# Patient Record
Sex: FEMALE | Race: ASIAN | Employment: FULL TIME | URBAN - METROPOLITAN AREA
[De-identification: names, ages, dates, MRNs, and addresses within clinical notes are randomized per-mention and may not be internally consistent; named-entity substitution may affect disease eponyms.]

---

## 2019-01-18 PROCEDURE — 88175 CYTOPATH C/V AUTO FLUID REDO: CPT | Performed by: OBSTETRICS & GYNECOLOGY

## 2019-01-18 PROCEDURE — 87624 HPV HI-RISK TYP POOLED RSLT: CPT | Performed by: OBSTETRICS & GYNECOLOGY

## 2019-03-18 ENCOUNTER — LAB ENCOUNTER (OUTPATIENT)
Dept: LAB | Facility: REFERENCE LAB | Age: 34
End: 2019-03-18
Attending: OBSTETRICS & GYNECOLOGY
Payer: COMMERCIAL

## 2019-03-18 ENCOUNTER — OFFICE VISIT (OUTPATIENT)
Dept: OBGYN CLINIC | Facility: CLINIC | Age: 34
End: 2019-03-18
Payer: COMMERCIAL

## 2019-03-18 VITALS
SYSTOLIC BLOOD PRESSURE: 110 MMHG | WEIGHT: 144 LBS | BODY MASS INDEX: 26.5 KG/M2 | DIASTOLIC BLOOD PRESSURE: 66 MMHG | HEIGHT: 62 IN

## 2019-03-18 DIAGNOSIS — Z11.3 SCREENING EXAMINATION FOR VENEREAL DISEASE: ICD-10-CM

## 2019-03-18 DIAGNOSIS — Z32.01 PREGNANCY EXAMINATION OR TEST, POSITIVE RESULT: ICD-10-CM

## 2019-03-18 DIAGNOSIS — Z32.01 PREGNANCY EXAMINATION OR TEST, POSITIVE RESULT: Primary | ICD-10-CM

## 2019-03-18 LAB
B-HCG SERPL-ACNC: ABNORMAL MIU/ML
CONTROL LINE PRESENT WITH A CLEAR BACKGROUND (YES/NO): YES YES/NO
PREGNANCY TEST, URINE: POSITIVE
PROGEST SERPL-MCNC: 22.2 NG/ML

## 2019-03-18 PROCEDURE — 36415 COLL VENOUS BLD VENIPUNCTURE: CPT

## 2019-03-18 PROCEDURE — 84702 CHORIONIC GONADOTROPIN TEST: CPT

## 2019-03-18 PROCEDURE — 87491 CHLMYD TRACH DNA AMP PROBE: CPT | Performed by: OBSTETRICS & GYNECOLOGY

## 2019-03-18 PROCEDURE — 81025 URINE PREGNANCY TEST: CPT | Performed by: OBSTETRICS & GYNECOLOGY

## 2019-03-18 PROCEDURE — 99203 OFFICE O/P NEW LOW 30 MIN: CPT | Performed by: OBSTETRICS & GYNECOLOGY

## 2019-03-18 PROCEDURE — 87591 N.GONORRHOEAE DNA AMP PROB: CPT | Performed by: OBSTETRICS & GYNECOLOGY

## 2019-03-18 PROCEDURE — 84144 ASSAY OF PROGESTERONE: CPT

## 2019-03-18 NOTE — PROGRESS NOTES
NEW GYN H&P     3/18/2019  9:42 AM    CC: Patient is here to establish care and for confirmation of pregnancy    HPI: Patient is a 29year old  LMP 2019 EGA 7.3w DEANGELO 2019 here to confirm positive HPT. + Office UCG today.  Reports fatigue an Sexual activity: Yes        Partners: Male        Birth control/protection: Condom    Social History    Social History Narrative      Not on file      ROS:     Review of Systems:  General: no complaints  Eyes: no complaints  Respiratory: no complaints  Car

## 2019-03-19 LAB
C TRACH DNA SPEC QL NAA+PROBE: NEGATIVE
N GONORRHOEA DNA SPEC QL NAA+PROBE: NEGATIVE

## 2019-03-19 NOTE — PROGRESS NOTES
Released to [employer] and results viewed by ptDaniel Mcghee on voicemail for pt to schedule ultrasound.

## 2019-03-21 ENCOUNTER — HOSPITAL ENCOUNTER (OUTPATIENT)
Dept: ULTRASOUND IMAGING | Facility: HOSPITAL | Age: 34
Discharge: HOME | End: 2019-03-21
Attending: OBSTETRICS & GYNECOLOGY
Payer: COMMERCIAL

## 2019-03-21 DIAGNOSIS — Z32.01 PREGNANCY EXAMINATION OR TEST, POSITIVE RESULT: ICD-10-CM

## 2019-03-21 PROCEDURE — 76801 OB US < 14 WKS SINGLE FETUS: CPT | Performed by: OBSTETRICS & GYNECOLOGY

## 2019-03-21 PROCEDURE — 76817 TRANSVAGINAL US OBSTETRIC: CPT | Performed by: OBSTETRICS & GYNECOLOGY

## 2019-04-10 ENCOUNTER — ROUTINE PRENATAL (OUTPATIENT)
Dept: OBGYN CLINIC | Facility: CLINIC | Age: 34
End: 2019-04-10
Payer: COMMERCIAL

## 2019-04-10 ENCOUNTER — LAB ENCOUNTER (OUTPATIENT)
Dept: LAB | Facility: REFERENCE LAB | Age: 34
End: 2019-04-10
Attending: OBSTETRICS & GYNECOLOGY
Payer: COMMERCIAL

## 2019-04-10 VITALS
WEIGHT: 145.88 LBS | SYSTOLIC BLOOD PRESSURE: 108 MMHG | BODY MASS INDEX: 26.84 KG/M2 | HEIGHT: 62 IN | DIASTOLIC BLOOD PRESSURE: 70 MMHG

## 2019-04-10 DIAGNOSIS — Z36.9 UNSPECIFIED ANTENATAL SCREENING: Primary | ICD-10-CM

## 2019-04-10 DIAGNOSIS — Z36.9 UNSPECIFIED ANTENATAL SCREENING: ICD-10-CM

## 2019-04-10 DIAGNOSIS — Z34.01 PRIMIGRAVIDA, FIRST TRIMESTER: ICD-10-CM

## 2019-04-10 DIAGNOSIS — O34.10 UTERINE FIBROID IN PREGNANCY: ICD-10-CM

## 2019-04-10 DIAGNOSIS — D25.9 UTERINE FIBROID IN PREGNANCY: ICD-10-CM

## 2019-04-10 PROCEDURE — 86780 TREPONEMA PALLIDUM: CPT

## 2019-04-10 PROCEDURE — 86901 BLOOD TYPING SEROLOGIC RH(D): CPT | Performed by: OBSTETRICS & GYNECOLOGY

## 2019-04-10 PROCEDURE — 85025 COMPLETE CBC W/AUTO DIFF WBC: CPT

## 2019-04-10 PROCEDURE — 86762 RUBELLA ANTIBODY: CPT

## 2019-04-10 PROCEDURE — 86850 RBC ANTIBODY SCREEN: CPT

## 2019-04-10 PROCEDURE — 87389 HIV-1 AG W/HIV-1&-2 AB AG IA: CPT

## 2019-04-10 PROCEDURE — 36415 COLL VENOUS BLD VENIPUNCTURE: CPT

## 2019-04-10 PROCEDURE — 81220 CFTR GENE COM VARIANTS: CPT

## 2019-04-10 PROCEDURE — 86900 BLOOD TYPING SEROLOGIC ABO: CPT | Performed by: OBSTETRICS & GYNECOLOGY

## 2019-04-10 PROCEDURE — 87340 HEPATITIS B SURFACE AG IA: CPT

## 2019-04-11 NOTE — PROGRESS NOTES
Here for NOB and PN labs + FFDNA screening. Counseling and prenatal assessment done.  Discussed genetic screening options, nutrition, prenatal vitamins, activity, lifestyle habits, exercise, travel, and avoidence of extreme heat and toxins such as nicotine,

## 2019-04-17 ENCOUNTER — TELEPHONE (OUTPATIENT)
Dept: OBGYN CLINIC | Facility: CLINIC | Age: 34
End: 2019-04-17

## 2019-04-17 NOTE — TELEPHONE ENCOUNTER
Per Dr Zahra Stone call patient with negative Prequel genetic screen. Spoke with patient negative results given along with sex of baby( Female). Pt very excited and happy about results.

## 2019-04-24 ENCOUNTER — ROUTINE PRENATAL (OUTPATIENT)
Dept: OBGYN CLINIC | Facility: CLINIC | Age: 34
End: 2019-04-24
Payer: COMMERCIAL

## 2019-04-24 VITALS
WEIGHT: 149.81 LBS | HEIGHT: 62 IN | DIASTOLIC BLOOD PRESSURE: 70 MMHG | SYSTOLIC BLOOD PRESSURE: 112 MMHG | BODY MASS INDEX: 27.57 KG/M2

## 2019-04-24 DIAGNOSIS — Z36.9 UNSPECIFIED ANTENATAL SCREENING: ICD-10-CM

## 2019-04-24 DIAGNOSIS — Z34.01 PRIMIGRAVIDA, FIRST TRIMESTER: Primary | ICD-10-CM

## 2019-04-24 PROBLEM — O09.899 RUBELLA NON-IMMUNE STATUS, ANTEPARTUM: Status: ACTIVE | Noted: 2019-04-24

## 2019-04-24 PROBLEM — O99.891 RUBELLA NON-IMMUNE STATUS, ANTEPARTUM: Status: ACTIVE | Noted: 2019-04-24

## 2019-04-24 PROBLEM — Z28.3 RUBELLA NON-IMMUNE STATUS, ANTEPARTUM: Status: ACTIVE | Noted: 2019-04-24

## 2019-04-24 PROBLEM — Z28.39 RUBELLA NON-IMMUNE STATUS, ANTEPARTUM: Status: ACTIVE | Noted: 2019-04-24

## 2019-04-24 PROBLEM — Z28.39 RUBELLA NON-IMMUNE STATUS, ANTEPARTUM (HCC): Status: ACTIVE | Noted: 2019-04-24

## 2019-04-24 PROBLEM — O09.899 RUBELLA NON-IMMUNE STATUS, ANTEPARTUM (HCC): Status: ACTIVE | Noted: 2019-04-24

## 2019-04-24 NOTE — PROGRESS NOTES
Appetite improving. Reviewed rubella non-immune status and need for vaccine postpartum. Has a cat - will check toxo titers.

## 2019-05-22 ENCOUNTER — ROUTINE PRENATAL (OUTPATIENT)
Dept: OBGYN CLINIC | Facility: CLINIC | Age: 34
End: 2019-05-22
Payer: COMMERCIAL

## 2019-05-22 VITALS
SYSTOLIC BLOOD PRESSURE: 116 MMHG | BODY MASS INDEX: 27.44 KG/M2 | WEIGHT: 149.13 LBS | DIASTOLIC BLOOD PRESSURE: 70 MMHG | HEIGHT: 62 IN

## 2019-05-22 DIAGNOSIS — Z34.02 PRIMIGRAVIDA, SECOND TRIMESTER: Primary | ICD-10-CM

## 2019-05-22 DIAGNOSIS — Z36.9 UNSPECIFIED ANTENATAL SCREENING: ICD-10-CM

## 2019-05-22 NOTE — PROGRESS NOTES
Feeling better with improved appetite. Still needs to get toxo titers done. For 20w anatomic survey next visit.

## 2019-06-27 ENCOUNTER — ROUTINE PRENATAL (OUTPATIENT)
Dept: OBGYN CLINIC | Facility: CLINIC | Age: 34
End: 2019-06-27
Payer: COMMERCIAL

## 2019-06-27 ENCOUNTER — ULTRASOUND ENCOUNTER (OUTPATIENT)
Dept: OBGYN CLINIC | Facility: CLINIC | Age: 34
End: 2019-06-27
Payer: COMMERCIAL

## 2019-06-27 VITALS
HEIGHT: 62 IN | DIASTOLIC BLOOD PRESSURE: 64 MMHG | WEIGHT: 152.69 LBS | SYSTOLIC BLOOD PRESSURE: 110 MMHG | BODY MASS INDEX: 28.1 KG/M2

## 2019-06-27 DIAGNOSIS — O34.10 UTERINE FIBROID IN PREGNANCY: ICD-10-CM

## 2019-06-27 DIAGNOSIS — Z34.02 PRIMIGRAVIDA, SECOND TRIMESTER: Primary | ICD-10-CM

## 2019-06-27 DIAGNOSIS — Z36.9 UNSPECIFIED ANTENATAL SCREENING: ICD-10-CM

## 2019-06-27 DIAGNOSIS — D25.9 UTERINE FIBROID IN PREGNANCY: ICD-10-CM

## 2019-06-27 PROCEDURE — 76817 TRANSVAGINAL US OBSTETRIC: CPT | Performed by: OBSTETRICS & GYNECOLOGY

## 2019-06-27 PROCEDURE — 76805 OB US >/= 14 WKS SNGL FETUS: CPT | Performed by: OBSTETRICS & GYNECOLOGY

## 2019-06-27 NOTE — PROGRESS NOTES
Active FM without complaints.  Had anatomic survey USN today = normal with small 1.4 x 1.1 cm lower anterior fibroid

## 2019-07-24 ENCOUNTER — ROUTINE PRENATAL (OUTPATIENT)
Dept: OBGYN CLINIC | Facility: CLINIC | Age: 34
End: 2019-07-24
Payer: COMMERCIAL

## 2019-07-24 VITALS
WEIGHT: 159.31 LBS | DIASTOLIC BLOOD PRESSURE: 60 MMHG | HEIGHT: 62 IN | BODY MASS INDEX: 29.32 KG/M2 | SYSTOLIC BLOOD PRESSURE: 106 MMHG

## 2019-07-24 DIAGNOSIS — Z36.9 UNSPECIFIED ANTENATAL SCREENING: Primary | ICD-10-CM

## 2019-07-24 DIAGNOSIS — Z34.02 PRIMIGRAVIDA, SECOND TRIMESTER: ICD-10-CM

## 2019-07-24 LAB — MULTISTIX LOT#: NORMAL NUMERIC

## 2019-07-24 PROCEDURE — 81002 URINALYSIS NONAUTO W/O SCOPE: CPT | Performed by: OBSTETRICS & GYNECOLOGY

## 2019-08-16 ENCOUNTER — HOSPITAL ENCOUNTER (OUTPATIENT)
Facility: HOSPITAL | Age: 34
Setting detail: OBSERVATION
Discharge: HOME | End: 2019-08-16
Attending: OBSTETRICS & GYNECOLOGY | Admitting: OBSTETRICS & GYNECOLOGY
Payer: COMMERCIAL

## 2019-08-16 VITALS — HEART RATE: 88 BPM | SYSTOLIC BLOOD PRESSURE: 130 MMHG | TEMPERATURE: 98 F | DIASTOLIC BLOOD PRESSURE: 72 MMHG

## 2019-08-16 PROBLEM — Z34.90 PREGNANCY (HCC): Status: ACTIVE | Noted: 2019-08-16

## 2019-08-16 PROBLEM — Z34.90 PREGNANCY: Status: ACTIVE | Noted: 2019-08-16

## 2019-08-16 LAB
BILIRUB UR QL: NEGATIVE
CLARITY UR: CLEAR
COLOR UR: YELLOW
GLUCOSE UR-MCNC: NEGATIVE MG/DL
KETONES UR-MCNC: NEGATIVE MG/DL
NITRITE UR QL STRIP.AUTO: NEGATIVE
PH UR: 7 [PH] (ref 5–8)
PROT UR-MCNC: NEGATIVE MG/DL
RBC #/AREA URNS AUTO: 3 /HPF
SP GR UR STRIP: 1.01 (ref 1–1.03)
UROBILINOGEN UR STRIP-ACNC: <2
VIT C UR-MCNC: NEGATIVE MG/DL
WBC #/AREA URNS AUTO: 12 /HPF

## 2019-08-16 PROCEDURE — 87086 URINE CULTURE/COLONY COUNT: CPT | Performed by: OBSTETRICS & GYNECOLOGY

## 2019-08-16 PROCEDURE — 99213 OFFICE O/P EST LOW 20 MIN: CPT

## 2019-08-16 PROCEDURE — 81001 URINALYSIS AUTO W/SCOPE: CPT | Performed by: OBSTETRICS & GYNECOLOGY

## 2019-08-16 RX ORDER — NITROFURANTOIN 25; 75 MG/1; MG/1
100 CAPSULE ORAL 2 TIMES DAILY
Qty: 6 CAPSULE | Refills: 0 | Status: SHIPPED | OUTPATIENT
Start: 2019-08-16 | End: 2019-08-19

## 2019-08-17 NOTE — TRIAGE
[facility]OSP - [facility]      Triage Note    Eleni Batch Patient Status:  Observation    1985 MRN [id number]   Location [facility] Attending Hortensia Jaime MD   Hosp Day # 0 PCP MD Tiffanie Farmer Appropriate for gestational age          FHR Category: Category I           Additional Comments       Reason for visit: Pt presented to triage with c/o constant bilateral lower abdominal pain that radiates to her back and urinary frequency/urgency.  Pt db

## 2019-08-17 NOTE — PROGRESS NOTES
Pt presented to triage with c/o bilateral lower abdominal pain that radiates to her back since 12p accompanied by urinary frequency/urgency. Pt states \"I didn't pay to much attention to fetal movements because of pain\".  Pt denies vag bleeding/LOF at this

## 2019-08-18 ENCOUNTER — PATIENT MESSAGE (OUTPATIENT)
Dept: OBGYN CLINIC | Facility: CLINIC | Age: 34
End: 2019-08-18

## 2019-08-19 NOTE — PROGRESS NOTES
Spoke to pt and informed her to continue her abx till complete. Pt will likely have a U/A done at her next appt on 8/28/19. Voiced understanding.

## 2019-08-19 NOTE — TELEPHONE ENCOUNTER
From: Lilli Felipe  To:  Erik Pat MD  Sent: 8/18/2019 12:30 PM CDT  Subject: Test Results Question    I have a question about URINE CULTURE, ROUTINE resulted on 8/17/19, 5:54 PM.    Hi Doctor,    Should I still schedule a nurse visit on Wednesday fo

## 2019-08-28 ENCOUNTER — ROUTINE PRENATAL (OUTPATIENT)
Dept: OBGYN CLINIC | Facility: CLINIC | Age: 34
End: 2019-08-28
Payer: COMMERCIAL

## 2019-08-28 ENCOUNTER — LAB ENCOUNTER (OUTPATIENT)
Dept: LAB | Facility: REFERENCE LAB | Age: 34
End: 2019-08-28
Attending: OBSTETRICS & GYNECOLOGY
Payer: COMMERCIAL

## 2019-08-28 VITALS
SYSTOLIC BLOOD PRESSURE: 108 MMHG | HEIGHT: 62 IN | BODY MASS INDEX: 30.03 KG/M2 | DIASTOLIC BLOOD PRESSURE: 66 MMHG | WEIGHT: 163.19 LBS

## 2019-08-28 DIAGNOSIS — Z36.9 UNSPECIFIED ANTENATAL SCREENING: Primary | ICD-10-CM

## 2019-08-28 DIAGNOSIS — Z36.9 UNSPECIFIED ANTENATAL SCREENING: ICD-10-CM

## 2019-08-28 DIAGNOSIS — Z34.03 PRIMIGRAVIDA, THIRD TRIMESTER: ICD-10-CM

## 2019-08-28 LAB
DEPRECATED RDW RBC AUTO: 40.6 FL (ref 35.1–46.3)
ERYTHROCYTE [DISTWIDTH] IN BLOOD BY AUTOMATED COUNT: 13.3 % (ref 11–15)
GLUCOSE 1H P GLC SERPL-MCNC: 172 MG/DL
HCT VFR BLD AUTO: 35 % (ref 35–48)
HGB BLD-MCNC: 11.8 G/DL (ref 12–16)
MCH RBC QN AUTO: 29 PG (ref 26–34)
MCHC RBC AUTO-ENTMCNC: 33.7 G/DL (ref 31–37)
MCV RBC AUTO: 86 FL (ref 80–100)
MULTISTIX LOT#: NORMAL NUMERIC
PLATELET # BLD AUTO: 259 10(3)UL (ref 150–450)
RBC # BLD AUTO: 4.07 X10(6)UL (ref 3.8–5.3)
WBC # BLD AUTO: 15.6 X10(3) UL (ref 4–11)

## 2019-08-28 PROCEDURE — 81002 URINALYSIS NONAUTO W/O SCOPE: CPT | Performed by: OBSTETRICS & GYNECOLOGY

## 2019-08-28 PROCEDURE — 36415 COLL VENOUS BLD VENIPUNCTURE: CPT

## 2019-08-28 PROCEDURE — 90715 TDAP VACCINE 7 YRS/> IM: CPT | Performed by: OBSTETRICS & GYNECOLOGY

## 2019-08-28 PROCEDURE — 85027 COMPLETE CBC AUTOMATED: CPT

## 2019-08-28 PROCEDURE — 90471 IMMUNIZATION ADMIN: CPT | Performed by: OBSTETRICS & GYNECOLOGY

## 2019-08-28 PROCEDURE — 82950 GLUCOSE TEST: CPT

## 2019-08-28 NOTE — PROGRESS NOTES
For routine PN visit and GTT + tdap today. No complaints with active FM and BHUCs - would like exam. SVE LTC.

## 2019-08-29 ENCOUNTER — TELEPHONE (OUTPATIENT)
Dept: OBGYN CLINIC | Facility: CLINIC | Age: 34
End: 2019-08-29

## 2019-08-29 DIAGNOSIS — O99.810 ABNORMAL MATERNAL GLUCOSE TOLERANCE, ANTEPARTUM: Primary | ICD-10-CM

## 2019-08-29 NOTE — PROGRESS NOTES
Carrie Woodson RN         8/29/19 11:02 AM   Note       Instructions given for 3 hour GTT testing. Order placed. Pt verbalized understanding and agrees with plan of care.

## 2019-08-29 NOTE — TELEPHONE ENCOUNTER
Instructions given for 3 hour GTT testing. Order placed. Pt verbalized understanding and agrees with plan of care.

## 2019-08-30 NOTE — TELEPHONE ENCOUNTER
Patient called to say she was going to [facility] for 3 hour GTT. Told patient that they should have the order and would be okay to go to [facility].

## 2019-09-03 ENCOUNTER — LAB ENCOUNTER (OUTPATIENT)
Dept: LAB | Age: 34
End: 2019-09-03
Attending: OBSTETRICS & GYNECOLOGY
Payer: COMMERCIAL

## 2019-09-03 DIAGNOSIS — O99.810 ABNORMAL MATERNAL GLUCOSE TOLERANCE, ANTEPARTUM: ICD-10-CM

## 2019-09-03 LAB
1 HR GLUCOSE GESTATIONAL: 187 MG/DL
GLUCOSE 1H P GLC SERPL-MCNC: 175 MG/DL
GLUCOSE 3H P GLC SERPL-MCNC: 120 MG/DL
GLUCOSE BLD-MCNC: 87 MG/DL (ref 70–99)
GLUCOSE P FAST SERPL-MCNC: 89 MG/DL

## 2019-09-03 PROCEDURE — 36415 COLL VENOUS BLD VENIPUNCTURE: CPT

## 2019-09-03 PROCEDURE — 82952 GTT-ADDED SAMPLES: CPT

## 2019-09-03 PROCEDURE — 82962 GLUCOSE BLOOD TEST: CPT

## 2019-09-03 PROCEDURE — 82951 GLUCOSE TOLERANCE TEST (GTT): CPT

## 2019-09-05 ENCOUNTER — TELEPHONE (OUTPATIENT)
Dept: OBGYN CLINIC | Facility: CLINIC | Age: 34
End: 2019-09-05

## 2019-09-05 DIAGNOSIS — O99.810 ABNORMAL MATERNAL GLUCOSE TOLERANCE, ANTEPARTUM: Primary | ICD-10-CM

## 2019-09-05 NOTE — TELEPHONE ENCOUNTER
Order generated for diabetic education. Pt called and given number to call to make this appointment. Pt has Cade Molina [id number] for 9/17. Pt verbalized understanding and agrees with plan of care.

## 2019-09-06 ENCOUNTER — PATIENT MESSAGE (OUTPATIENT)
Dept: OBGYN CLINIC | Facility: CLINIC | Age: 34
End: 2019-09-06

## 2019-09-07 ENCOUNTER — DIABETIC EDUCATION (OUTPATIENT)
Dept: ENDOCRINOLOGY CLINIC | Facility: CLINIC | Age: 34
End: 2019-09-07
Payer: COMMERCIAL

## 2019-09-07 DIAGNOSIS — O24.410 DIET CONTROLLED GESTATIONAL DIABETES MELLITUS (GDM) IN THIRD TRIMESTER: Primary | ICD-10-CM

## 2019-09-07 PROCEDURE — 97804 MEDICAL NUTRITION GROUP: CPT | Performed by: DIETITIAN, REGISTERED

## 2019-09-07 RX ORDER — BLOOD SUGAR DIAGNOSTIC
1 STRIP MISCELLANEOUS 4 TIMES DAILY
Qty: 200 STRIP | Refills: 1 | Status: ON HOLD | OUTPATIENT
Start: 2019-09-07 | End: 2019-10-26

## 2019-09-07 RX ORDER — LANCETS 33 GAUGE
1 EACH MISCELLANEOUS 4 TIMES DAILY
Qty: 1 BOX | Refills: 2 | Status: ON HOLD | OUTPATIENT
Start: 2019-09-07 | End: 2019-10-28

## 2019-09-07 NOTE — PROGRESS NOTES
Luís Boo   1985 was seen for Gestational Diabetes Counseling: Group    Date: 2019  Start time: 8:00 End time: 9:30    : 1  Para: 0  EDC: 11/3/19    GDM Screen: 3 hour Referring Provider: Dr Janel Lewis History of GDM:  NO    Family hist recommended meal plan. 2. Begin testing fasting glucose and 2 hour after meals   3. Bring glucose / food log to next MD office visit. 4. Encouraged activity if no restrictions.    5. Encouraged Cherie to call diabetes center with any questions or concerns

## 2019-09-14 ENCOUNTER — DIABETIC EDUCATION (OUTPATIENT)
Dept: ENDOCRINOLOGY CLINIC | Facility: CLINIC | Age: 34
End: 2019-09-14
Payer: COMMERCIAL

## 2019-09-14 DIAGNOSIS — O24.410 DIET CONTROLLED GESTATIONAL DIABETES MELLITUS (GDM) IN THIRD TRIMESTER: Primary | ICD-10-CM

## 2019-09-14 PROCEDURE — 97803 MED NUTRITION INDIV SUBSEQ: CPT | Performed by: DIETITIAN, REGISTERED

## 2019-09-14 NOTE — PROGRESS NOTES
Girma Fernandes  7/5/1985 was seen for Gestational Diabetes Education Follow-Up    9/14/2019  Start time: 9:40 End time: 9:55  Referring Provider: Jesus Alberto Vences    Diet: to reduce starch (bread) and increase vegetables. Sub sand with chips: BG's were elevated.

## 2019-09-17 ENCOUNTER — ROUTINE PRENATAL (OUTPATIENT)
Dept: OBGYN CLINIC | Facility: CLINIC | Age: 34
End: 2019-09-17
Payer: COMMERCIAL

## 2019-09-17 VITALS
SYSTOLIC BLOOD PRESSURE: 120 MMHG | BODY MASS INDEX: 30 KG/M2 | DIASTOLIC BLOOD PRESSURE: 78 MMHG | WEIGHT: 163 LBS | HEIGHT: 62 IN

## 2019-09-17 DIAGNOSIS — Z34.03 PRIMIGRAVIDA, THIRD TRIMESTER: Primary | ICD-10-CM

## 2019-09-17 PROBLEM — O24.410 DIET CONTROLLED GESTATIONAL DIABETES MELLITUS (GDM) IN THIRD TRIMESTER: Status: ACTIVE | Noted: 2019-09-17

## 2019-09-17 PROBLEM — O24.410 DIET CONTROLLED GESTATIONAL DIABETES MELLITUS (GDM) IN THIRD TRIMESTER (HCC): Status: ACTIVE | Noted: 2019-09-17

## 2019-09-17 LAB
GLUCOSE (URINE DIPSTICK): NEGATIVE MG/DL
MULTISTIX EXPIRATION DATE: NORMAL DATE
MULTISTIX LOT#: NORMAL NUMERIC
PROTEIN (URINE DIPSTICK): NEGATIVE MG/DL

## 2019-09-17 PROCEDURE — 81002 URINALYSIS NONAUTO W/O SCOPE: CPT | Performed by: OBSTETRICS & GYNECOLOGY

## 2019-09-17 NOTE — PROGRESS NOTES
Gestational diabetic. Fasting BS are in 90' - 100s and 2 hour PP are 95- 155 with most more than 120. No current meds. Feels like she is compliant with her diet.   Feeling very tired and lightheaded, dizzy every morning and having difficulty traveling or dr

## 2019-09-24 ENCOUNTER — ROUTINE PRENATAL (OUTPATIENT)
Dept: OBGYN CLINIC | Facility: CLINIC | Age: 34
End: 2019-09-24
Payer: COMMERCIAL

## 2019-09-24 VITALS
SYSTOLIC BLOOD PRESSURE: 110 MMHG | HEIGHT: 62 IN | WEIGHT: 163 LBS | DIASTOLIC BLOOD PRESSURE: 78 MMHG | BODY MASS INDEX: 30 KG/M2

## 2019-09-24 DIAGNOSIS — Z34.03 PRIMIGRAVIDA, THIRD TRIMESTER: Primary | ICD-10-CM

## 2019-09-24 DIAGNOSIS — O24.415 GESTATIONAL DIABETES MELLITUS (GDM) IN THIRD TRIMESTER CONTROLLED ON ORAL HYPOGLYCEMIC DRUG: ICD-10-CM

## 2019-09-24 PROCEDURE — 81002 URINALYSIS NONAUTO W/O SCOPE: CPT | Performed by: OBSTETRICS & GYNECOLOGY

## 2019-09-24 NOTE — PROGRESS NOTES
She started using only one metformin and her BS are all normal except for some 130's. I dw her that she does not need to use 2nd metfomin unless she sees her BS increasing again. NST's reactive her today. Check USN for growth.  She feels like her dizziness

## 2019-09-26 RX ORDER — BREAST PUMP
EACH MISCELLANEOUS
Qty: 1 EACH | Refills: 0 | OUTPATIENT
Start: 2019-09-26 | End: 2019-12-24

## 2019-10-02 ENCOUNTER — ROUTINE PRENATAL (OUTPATIENT)
Dept: OBGYN CLINIC | Facility: CLINIC | Age: 34
End: 2019-10-02
Payer: COMMERCIAL

## 2019-10-02 ENCOUNTER — HOSPITAL ENCOUNTER (OUTPATIENT)
Facility: HOSPITAL | Age: 34
Setting detail: OBSERVATION
Discharge: HOME | End: 2019-10-02
Attending: OBSTETRICS & GYNECOLOGY | Admitting: OBSTETRICS & GYNECOLOGY
Payer: COMMERCIAL

## 2019-10-02 VITALS — DIASTOLIC BLOOD PRESSURE: 82 MMHG | HEART RATE: 84 BPM | SYSTOLIC BLOOD PRESSURE: 115 MMHG

## 2019-10-02 VITALS
SYSTOLIC BLOOD PRESSURE: 122 MMHG | WEIGHT: 163 LBS | HEIGHT: 62 IN | BODY MASS INDEX: 30 KG/M2 | DIASTOLIC BLOOD PRESSURE: 80 MMHG

## 2019-10-02 DIAGNOSIS — Z36.9 UNSPECIFIED ANTENATAL SCREENING: Primary | ICD-10-CM

## 2019-10-02 PROCEDURE — 87653 STREP B DNA AMP PROBE: CPT | Performed by: OBSTETRICS & GYNECOLOGY

## 2019-10-02 PROCEDURE — 81002 URINALYSIS NONAUTO W/O SCOPE: CPT | Performed by: OBSTETRICS & GYNECOLOGY

## 2019-10-02 PROCEDURE — 99212 OFFICE O/P EST SF 10 MIN: CPT

## 2019-10-02 PROCEDURE — 59025 FETAL NON-STRESS TEST: CPT

## 2019-10-02 PROCEDURE — 87081 CULTURE SCREEN ONLY: CPT | Performed by: OBSTETRICS & GYNECOLOGY

## 2019-10-02 NOTE — PROGRESS NOTES
Fer Florez is here for a checkup. Patient says that her 2 hours post dinner blood sugars have been consistently above 120 in the range of 150s. I recommended that she takes 500 mg of metformin in the evening.   Her nonstress test today was nonreactive patient w

## 2019-10-03 NOTE — PROGRESS NOTES
PT PRESENTED TO TRIAGE WITH C/O DECREASED FETAL MOVEMENTS. PT DENIES VAG BLEEDING/LOF AT THIS TIME. PT ORIENTED TO ROOM, CALL LIGHT AND POC-PT VERBALIZES UNDERSTANDING.

## 2019-10-03 NOTE — TRIAGE
[facility]      Triage Note    Enrike Roy Patient Status:  Observation    1985 MRN [id number]   Location [facility] Attending [facility] Day # 0 PCP MD Marilyn Pandey Category: Category I           Additional Comments       Reason for visit: Pt presented to TR 3 with c/o decreased fetal movements. Pt denies vag bleeding/LOF at this time. Vitals WNL. NST reactive. Dr. Fei Chery notified of results; Kathryn West.  Pt d/c home in s

## 2019-10-10 ENCOUNTER — ROUTINE PRENATAL (OUTPATIENT)
Dept: OBGYN CLINIC | Facility: CLINIC | Age: 34
End: 2019-10-10
Payer: COMMERCIAL

## 2019-10-10 ENCOUNTER — LAB ENCOUNTER (OUTPATIENT)
Dept: LAB | Facility: REFERENCE LAB | Age: 34
End: 2019-10-10
Attending: OBSTETRICS & GYNECOLOGY
Payer: COMMERCIAL

## 2019-10-10 ENCOUNTER — ULTRASOUND ENCOUNTER (OUTPATIENT)
Dept: OBGYN CLINIC | Facility: CLINIC | Age: 34
End: 2019-10-10
Payer: COMMERCIAL

## 2019-10-10 VITALS
DIASTOLIC BLOOD PRESSURE: 70 MMHG | WEIGHT: 166 LBS | HEIGHT: 62 IN | BODY MASS INDEX: 30.55 KG/M2 | SYSTOLIC BLOOD PRESSURE: 102 MMHG

## 2019-10-10 DIAGNOSIS — O24.415 GESTATIONAL DIABETES MELLITUS (GDM) IN THIRD TRIMESTER CONTROLLED ON ORAL HYPOGLYCEMIC DRUG: ICD-10-CM

## 2019-10-10 DIAGNOSIS — Z34.03 PRIMIGRAVIDA, THIRD TRIMESTER: Primary | ICD-10-CM

## 2019-10-10 DIAGNOSIS — Z36.9 UNSPECIFIED ANTENATAL SCREENING: ICD-10-CM

## 2019-10-10 PROCEDURE — 36415 COLL VENOUS BLD VENIPUNCTURE: CPT

## 2019-10-10 PROCEDURE — 83036 HEMOGLOBIN GLYCOSYLATED A1C: CPT

## 2019-10-10 PROCEDURE — 87389 HIV-1 AG W/HIV-1&-2 AB AG IA: CPT

## 2019-10-10 PROCEDURE — 86780 TREPONEMA PALLIDUM: CPT

## 2019-10-10 PROCEDURE — 85025 COMPLETE CBC W/AUTO DIFF WBC: CPT

## 2019-10-10 PROCEDURE — 76816 OB US FOLLOW-UP PER FETUS: CPT | Performed by: OBSTETRICS & GYNECOLOGY

## 2019-10-10 PROCEDURE — 81002 URINALYSIS NONAUTO W/O SCOPE: CPT | Performed by: OBSTETRICS & GYNECOLOGY

## 2019-10-10 NOTE — PROGRESS NOTES
35 yo  at 39 W with GDM on metformin here for PN visit. Using metformin 500 mg po bid and accuchecks are normal FBS = 90 and 2 hour PP less than 120. GBBS negative. Had usn today with EFW 3096 = 55.7%, FLORIN = 16.52.  Plan IOL 10/24/2019 PM with likely ce

## 2019-10-15 ENCOUNTER — ROUTINE PRENATAL (OUTPATIENT)
Dept: OBGYN CLINIC | Facility: CLINIC | Age: 34
End: 2019-10-15
Payer: COMMERCIAL

## 2019-10-15 VITALS
BODY MASS INDEX: 30.55 KG/M2 | DIASTOLIC BLOOD PRESSURE: 68 MMHG | WEIGHT: 166 LBS | SYSTOLIC BLOOD PRESSURE: 104 MMHG | HEIGHT: 62 IN

## 2019-10-15 DIAGNOSIS — Z36.9 UNSPECIFIED ANTENATAL SCREENING: Primary | ICD-10-CM

## 2019-10-15 DIAGNOSIS — O24.415 GESTATIONAL DIABETES MELLITUS (GDM) IN THIRD TRIMESTER CONTROLLED ON ORAL HYPOGLYCEMIC DRUG: ICD-10-CM

## 2019-10-15 PROCEDURE — 59025 FETAL NON-STRESS TEST: CPT | Performed by: OBSTETRICS & GYNECOLOGY

## 2019-10-15 PROCEDURE — 81002 URINALYSIS NONAUTO W/O SCOPE: CPT | Performed by: OBSTETRICS & GYNECOLOGY

## 2019-10-15 NOTE — PROGRESS NOTES
OMAR  Pt is a 36year old  at 37w4d   Doing well, +FM  Denies LOF/VB/regular uctx  Blood sugars: Fasting <95, 2h pp lunch and dinner <120. Breakfast 120-140.      SVE /-3    BPM   FH 37 cm   GBS negative  Fetal movement count reviewed   Rev

## 2019-10-21 ENCOUNTER — TELEPHONE (OUTPATIENT)
Dept: OBGYN UNIT | Facility: HOSPITAL | Age: 34
End: 2019-10-21

## 2019-10-22 ENCOUNTER — HOSPITAL ENCOUNTER (OUTPATIENT)
Facility: HOSPITAL | Age: 34
Setting detail: OBSERVATION
Discharge: HOME | End: 2019-10-22
Attending: OBSTETRICS & GYNECOLOGY | Admitting: OBSTETRICS & GYNECOLOGY
Payer: COMMERCIAL

## 2019-10-22 ENCOUNTER — TELEPHONE (OUTPATIENT)
Dept: OBGYN CLINIC | Facility: CLINIC | Age: 34
End: 2019-10-22

## 2019-10-22 PROCEDURE — 99214 OFFICE O/P EST MOD 30 MIN: CPT

## 2019-10-22 PROCEDURE — 59025 FETAL NON-STRESS TEST: CPT

## 2019-10-22 NOTE — TELEPHONE ENCOUNTER
Pt calling, stating she has been leaking clear fluids with slight , soaking her underwear since 5am. Pt denies bleeding; pt reports decreased FM. Advised pt to go to L&D for eval. Pt will have  drive her. Report given to EB and L&D triage RN.  Pt lucio

## 2019-10-22 NOTE — PROGRESS NOTES
Pt is a 36year old female admitted to TR2/TR2-A. Patient presents with:  Srom: pt c/o possible srom around 0730 this am, then again at 1030 am , Just dampness now. Pt is  38w4d intra-uterine pregnancy. History obtained, consents signed.  Or

## 2019-10-22 NOTE — TRIAGE
[facility]      Triage Note    Hyacinth Lopez Patient Status:  Observation    1985 MRN [id number]   Location 719 [facility] Attending Charlotte John MD   Hosp Day # 0 PCP MD Alise Mohan Reason for visit: Notified Dr Velásquez Labs of Intact membranes, and reactive NST. Received order to discharge to home. Instructions for kick counts, the importance of hydration, and the signs And symptoms of labor.    Discharged pt to home via ambulatory i

## 2019-10-24 ENCOUNTER — HOSPITAL ENCOUNTER (INPATIENT)
Facility: HOSPITAL | Age: 34
LOS: 4 days | Discharge: HOME | End: 2019-10-28
Attending: OBSTETRICS & GYNECOLOGY | Admitting: OBSTETRICS & GYNECOLOGY
Payer: COMMERCIAL

## 2019-10-24 ENCOUNTER — APPOINTMENT (OUTPATIENT)
Dept: OBGYN CLINIC | Facility: HOSPITAL | Age: 34
End: 2019-10-24
Attending: OBSTETRICS & GYNECOLOGY
Payer: COMMERCIAL

## 2019-10-24 RX ORDER — DEXTROSE, SODIUM CHLORIDE, SODIUM LACTATE, POTASSIUM CHLORIDE, AND CALCIUM CHLORIDE 5; .6; .31; .03; .02 G/100ML; G/100ML; G/100ML; G/100ML; G/100ML
INJECTION, SOLUTION INTRAVENOUS AS NEEDED
Status: DISCONTINUED | OUTPATIENT
Start: 2019-10-24 | End: 2019-10-26 | Stop reason: HOSPADM

## 2019-10-24 RX ORDER — SODIUM CHLORIDE 0.9 % (FLUSH) 0.9 %
10 SYRINGE (ML) INJECTION AS NEEDED
Status: DISCONTINUED | OUTPATIENT
Start: 2019-10-24 | End: 2019-10-26 | Stop reason: HOSPADM

## 2019-10-24 RX ORDER — LIDOCAINE HYDROCHLORIDE 10 MG/ML
30 INJECTION, SOLUTION EPIDURAL; INFILTRATION; INTRACAUDAL; PERINEURAL ONCE
Status: COMPLETED | OUTPATIENT
Start: 2019-10-24 | End: 2019-10-26

## 2019-10-24 RX ORDER — AMMONIA INHALANTS 0.04 G/.3ML
0.3 INHALANT RESPIRATORY (INHALATION) AS NEEDED
Status: DISCONTINUED | OUTPATIENT
Start: 2019-10-24 | End: 2019-10-26 | Stop reason: HOSPADM

## 2019-10-24 RX ORDER — TRISODIUM CITRATE DIHYDRATE AND CITRIC ACID MONOHYDRATE 500; 334 MG/5ML; MG/5ML
30 SOLUTION ORAL AS NEEDED
Status: DISCONTINUED | OUTPATIENT
Start: 2019-10-24 | End: 2019-10-26 | Stop reason: HOSPADM

## 2019-10-24 RX ORDER — TERBUTALINE SULFATE 1 MG/ML
0.25 INJECTION, SOLUTION SUBCUTANEOUS AS NEEDED
Status: DISCONTINUED | OUTPATIENT
Start: 2019-10-24 | End: 2019-10-26 | Stop reason: HOSPADM

## 2019-10-24 RX ORDER — SODIUM CHLORIDE, SODIUM LACTATE, POTASSIUM CHLORIDE, CALCIUM CHLORIDE 600; 310; 30; 20 MG/100ML; MG/100ML; MG/100ML; MG/100ML
INJECTION, SOLUTION INTRAVENOUS CONTINUOUS
Status: DISCONTINUED | OUTPATIENT
Start: 2019-10-24 | End: 2019-10-26 | Stop reason: HOSPADM

## 2019-10-24 RX ORDER — IBUPROFEN 600 MG/1
600 TABLET ORAL ONCE AS NEEDED
Status: DISCONTINUED | OUTPATIENT
Start: 2019-10-24 | End: 2019-10-26 | Stop reason: HOSPADM

## 2019-10-24 NOTE — PROGRESS NOTES
Pt is a 36year old female admitted to 371/371-A. IOL GDM   Pt is  38w6d intra-uterine pregnancy. History obtained, consents signed. Oriented to room, staff, and plan of care.

## 2019-10-25 ENCOUNTER — ANESTHESIA (OUTPATIENT)
Dept: OBGYN UNIT | Facility: HOSPITAL | Age: 34
End: 2019-10-25
Payer: COMMERCIAL

## 2019-10-25 ENCOUNTER — ANESTHESIA EVENT (OUTPATIENT)
Dept: OBGYN UNIT | Facility: HOSPITAL | Age: 34
End: 2019-10-25
Payer: COMMERCIAL

## 2019-10-25 PROCEDURE — 3E0P7VZ INTRODUCTION OF HORMONE INTO FEMALE REPRODUCTIVE, VIA NATURAL OR ARTIFICIAL OPENING: ICD-10-PCS | Performed by: OBSTETRICS & GYNECOLOGY

## 2019-10-25 RX ORDER — LIDOCAINE HYDROCHLORIDE 10 MG/ML
INJECTION, SOLUTION EPIDURAL; INFILTRATION; INTRACAUDAL; PERINEURAL
Status: DISPENSED
Start: 2019-10-25 | End: 2019-10-26

## 2019-10-25 RX ORDER — BUPIVACAINE HYDROCHLORIDE 2.5 MG/ML
INJECTION, SOLUTION EPIDURAL; INFILTRATION; INTRACAUDAL
Status: DISPENSED
Start: 2019-10-25 | End: 2019-10-26

## 2019-10-25 RX ORDER — BUPIVACAINE HYDROCHLORIDE 2.5 MG/ML
INJECTION, SOLUTION EPIDURAL; INFILTRATION; INTRACAUDAL
Status: COMPLETED | OUTPATIENT
Start: 2019-10-25 | End: 2019-10-25

## 2019-10-25 RX ORDER — LIDOCAINE HYDROCHLORIDE AND EPINEPHRINE 20; 5 MG/ML; UG/ML
30 INJECTION, SOLUTION EPIDURAL; INFILTRATION; INTRACAUDAL; PERINEURAL ONCE
Status: DISCONTINUED | OUTPATIENT
Start: 2019-10-25 | End: 2019-10-26

## 2019-10-25 RX ORDER — LIDOCAINE HYDROCHLORIDE 10 MG/ML
INJECTION, SOLUTION INFILTRATION; PERINEURAL
Status: COMPLETED | OUTPATIENT
Start: 2019-10-25 | End: 2019-10-25

## 2019-10-25 RX ORDER — LIDOCAINE HYDROCHLORIDE AND EPINEPHRINE 20; 5 MG/ML; UG/ML
INJECTION, SOLUTION EPIDURAL; INFILTRATION; INTRACAUDAL; PERINEURAL
Status: DISCONTINUED
Start: 2019-10-25 | End: 2019-10-26 | Stop reason: WASHOUT

## 2019-10-25 RX ORDER — ONDANSETRON 2 MG/ML
INJECTION INTRAMUSCULAR; INTRAVENOUS
Status: COMPLETED
Start: 2019-10-25 | End: 2019-10-25

## 2019-10-25 RX ORDER — NALBUPHINE HCL 10 MG/ML
2.5 AMPUL (ML) INJECTION
Status: DISCONTINUED | OUTPATIENT
Start: 2019-10-25 | End: 2019-10-26

## 2019-10-25 RX ORDER — LIDOCAINE HYDROCHLORIDE AND EPINEPHRINE 15; 5 MG/ML; UG/ML
INJECTION, SOLUTION EPIDURAL
Status: COMPLETED | OUTPATIENT
Start: 2019-10-25 | End: 2019-10-25

## 2019-10-25 RX ORDER — DEXTROSE MONOHYDRATE 25 G/50ML
25 INJECTION, SOLUTION INTRAVENOUS AS NEEDED
Status: DISCONTINUED | OUTPATIENT
Start: 2019-10-25 | End: 2019-10-26

## 2019-10-25 RX ORDER — BUPIVACAINE HYDROCHLORIDE 2.5 MG/ML
30 INJECTION, SOLUTION EPIDURAL; INFILTRATION; INTRACAUDAL ONCE
Status: DISCONTINUED | OUTPATIENT
Start: 2019-10-25 | End: 2019-10-26

## 2019-10-25 RX ORDER — ONDANSETRON 2 MG/ML
4 INJECTION INTRAMUSCULAR; INTRAVENOUS EVERY 6 HOURS PRN
Status: DISCONTINUED | OUTPATIENT
Start: 2019-10-25 | End: 2019-10-26

## 2019-10-25 RX ORDER — DEXTROSE MONOHYDRATE 25 G/50ML
INJECTION, SOLUTION INTRAVENOUS
Status: COMPLETED
Start: 2019-10-25 | End: 2019-10-25

## 2019-10-25 RX ORDER — EPHEDRINE SULFATE/0.9% NACL/PF 25 MG/5 ML
5 SYRINGE (ML) INTRAVENOUS AS NEEDED
Status: DISCONTINUED | OUTPATIENT
Start: 2019-10-25 | End: 2019-10-26

## 2019-10-25 RX ADMIN — LIDOCAINE HYDROCHLORIDE AND EPINEPHRINE 5 ML: 15; 5 INJECTION, SOLUTION EPIDURAL at 14:50:00

## 2019-10-25 RX ADMIN — LIDOCAINE HYDROCHLORIDE AND EPINEPHRINE 3 ML: 15; 5 INJECTION, SOLUTION EPIDURAL at 21:46:00

## 2019-10-25 RX ADMIN — BUPIVACAINE HYDROCHLORIDE 5 ML: 2.5 INJECTION, SOLUTION EPIDURAL; INFILTRATION; INTRACAUDAL at 21:46:00

## 2019-10-25 RX ADMIN — LIDOCAINE HYDROCHLORIDE 5 ML: 10 INJECTION, SOLUTION INFILTRATION; PERINEURAL at 14:50:00

## 2019-10-25 NOTE — ANESTHESIA PROCEDURE NOTES
Labor Analgesia  Date/Time: 10/25/2019 2:50 PM  Performed by: Meka Ramirez MD  Authorized by: Meka Ramirez MD     Patient Location:  OB  Start Time:  10/25/2019 2:30 PM  End Time:  10/25/2019 2:51 PM  Site Identification: surface landmarks    Reason for Bl

## 2019-10-25 NOTE — ANESTHESIA PREPROCEDURE EVALUATION
Anesthesia PreOp Note    HPI:     Edgardo Lopez is a 36year old female who presents for preoperative consultation requested by: * No surgeons listed *    Date of Surgery: 10/25/2019    * No procedures listed *  Indication: * No pre-op diagnosis entered * Finger stick route 4 (four) times daily. , Disp: 1 Box, Rfl: 2, Taking      oxyTOCIN (PITOCIN) 30 units/ 500 ml 0.9% NS premix infusion, 0.5-20 taj-units/min, Intravenous, Continuous, Nobimarc Tucker MD, Last Rate: 10 mL/hr at 10/25/19 1030, 10 taj-uni (Parkinson's) Father 61   • No Known Problems Mother    • Diabetes Paternal Grandmother      Social History    Socioeconomic History      Marital status:       Spouse name: Not on file      Number of children: Not on file      Years of education: No 221.0 10/24/2019     Lab Results   Component Value Date    PGLU 75 10/25/2019    PGLU 87 09/03/2019          Vital Signs: There is no height or weight on file to calculate BMI. oral temperature is 98.2 °F (36.8 °C).  Her blood pressure is 108/70 and her planned.     DYLAN PELLETIER  10/25/2019 2:28 PM

## 2019-10-25 NOTE — H&P
GYN H&P     10/25/2019  2:24 PM    CC: Patient is here for IOL for GDM controlled with oral hyperglycemics    HPI: Patient is a 36year old  at 44 W admitted for IOL for gestational diabetes controlled with metformin.      Patient's last menstrual pe well developed, well nourished, in no apparent distress  SKIN: no rashes, no suspicious lesions  HEENT: normal  NECK: supple; no thyroidmegaly, no adenopathy  BREASTS: symmetrical, nontender, no palpable masses or nodes, no nipple discharge, no skin change

## 2019-10-26 PROBLEM — Z34.90 ENCOUNTER FOR INDUCTION OF LABOR: Status: ACTIVE | Noted: 2019-10-26

## 2019-10-26 PROBLEM — Z34.90 ENCOUNTER FOR INDUCTION OF LABOR (HCC): Status: ACTIVE | Noted: 2019-10-26

## 2019-10-26 PROCEDURE — 59400 OBSTETRICAL CARE: CPT | Performed by: OBSTETRICS & GYNECOLOGY

## 2019-10-26 PROCEDURE — 0KQM0ZZ REPAIR PERINEUM MUSCLE, OPEN APPROACH: ICD-10-PCS | Performed by: OBSTETRICS & GYNECOLOGY

## 2019-10-26 RX ORDER — ONDANSETRON 2 MG/ML
4 INJECTION INTRAMUSCULAR; INTRAVENOUS EVERY 6 HOURS PRN
Status: DISCONTINUED | OUTPATIENT
Start: 2019-10-26 | End: 2019-10-28

## 2019-10-26 RX ORDER — BISACODYL 10 MG
10 SUPPOSITORY, RECTAL RECTAL ONCE AS NEEDED
Status: DISCONTINUED | OUTPATIENT
Start: 2019-10-26 | End: 2019-10-28

## 2019-10-26 RX ORDER — IBUPROFEN 600 MG/1
600 TABLET ORAL EVERY 4 HOURS PRN
Status: DISCONTINUED | OUTPATIENT
Start: 2019-10-26 | End: 2019-10-28

## 2019-10-26 RX ORDER — IBUPROFEN 400 MG/1
200 TABLET ORAL EVERY 4 HOURS PRN
Status: DISCONTINUED | OUTPATIENT
Start: 2019-10-26 | End: 2019-10-28

## 2019-10-26 RX ORDER — IBUPROFEN 400 MG/1
400 TABLET ORAL EVERY 4 HOURS PRN
Status: DISCONTINUED | OUTPATIENT
Start: 2019-10-26 | End: 2019-10-28

## 2019-10-26 RX ORDER — CHOLECALCIFEROL (VITAMIN D3) 25 MCG
1 TABLET,CHEWABLE ORAL DAILY
Status: DISCONTINUED | OUTPATIENT
Start: 2019-10-26 | End: 2019-10-28

## 2019-10-26 RX ORDER — AMMONIA INHALANTS 0.04 G/.3ML
0.3 INHALANT RESPIRATORY (INHALATION) AS NEEDED
Status: DISCONTINUED | OUTPATIENT
Start: 2019-10-26 | End: 2019-10-28

## 2019-10-26 RX ORDER — SIMETHICONE 80 MG
80 TABLET,CHEWABLE ORAL 3 TIMES DAILY PRN
Status: DISCONTINUED | OUTPATIENT
Start: 2019-10-26 | End: 2019-10-28

## 2019-10-26 RX ORDER — SODIUM CHLORIDE 0.9 % (FLUSH) 0.9 %
10 SYRINGE (ML) INJECTION AS NEEDED
Status: DISCONTINUED | OUTPATIENT
Start: 2019-10-26 | End: 2019-10-28

## 2019-10-26 RX ORDER — DOCUSATE SODIUM 100 MG/1
100 CAPSULE, LIQUID FILLED ORAL 2 TIMES DAILY
Status: DISCONTINUED | OUTPATIENT
Start: 2019-10-26 | End: 2019-10-28

## 2019-10-26 RX ORDER — DIAPER,BRIEF,INFANT-TODD,DISP
1 EACH MISCELLANEOUS EVERY 6 HOURS PRN
Status: DISCONTINUED | OUTPATIENT
Start: 2019-10-26 | End: 2019-10-28

## 2019-10-26 NOTE — LACTATION NOTE
This note was copied from a baby's chart.   LACTATION NOTE - INFANT    Evaluation Type  Evaluation Type: Inpatient    Problems & Assessment  Problems Diagnosed or Identified: (high palate; inconsistently extending tongue )  Infant Assessment: Hunger cues pr

## 2019-10-26 NOTE — PROGRESS NOTES
PATIENT STATES FAMILY IS BRINGING DINNER. PATIENT ADVISED TO NOTIFY NURSE BEFORE EATING BUT FORGOT TO CALL PRIOR TO MEAL. NO AC ACCUCHECK DONE.

## 2019-10-26 NOTE — ANESTHESIA PROCEDURE NOTES
Labor Analgesia  Date/Time: 10/25/2019 9:46 PM  Performed by: Rob Padilla MD  Authorized by: Rob Padilla MD     Patient Location:  OB  Start Time:  10/25/2019 9:35 PM  End Time:  10/25/2019 9:48 PM  Site Identification: surface landmarks    Reason for Bl

## 2019-10-26 NOTE — L&D DELIVERY NOTE
Danita Baker Girl Nazanin [[id number]]    Labor Events     labor?:  No   steroids?:  None  Cervical ripening date/time:  10/24/2019 184  Cervical ripening type:  Cervidil  Antibiotics received during labor?:  No  Antibiotics (enter # doses in comme Skin color Blue or pale Acrocyanotic Completely pink    Heart rate Absent <100 bpm >100 bpm    Reflex irritability No response Grimace Cry or active withdrawal    Muscle tone Limp Some flexion Active motion    Respiratory effort Absent Weak cry; hypoven

## 2019-10-26 NOTE — ANESTHESIA POSTPROCEDURE EVALUATION
Patient: Beth Arredondo    Procedure Summary     Date:  10/25/19 Room / Location:      Anesthesia Start:  [place] [address] Anesthesia Stop:  10/26/19 0121    Procedure:  LABOR ANALGESIA Diagnosis:      Scheduled Providers:   Anesthesiologist:  MD Nilay Kaur

## 2019-10-26 NOTE — LACTATION NOTE
LACTATION NOTE - MOTHER      Evaluation Type: Inpatient    Maternal history  Maternal history: Gestational diabetes  Other/comment: history of uterine fibroid and 2018 treated for TB    Breastfeeding goal  Breastfeeding goal: To maintain breast milk feedin accepted information and verbalized understanding of information. Will follow PRN.

## 2019-10-26 NOTE — PROGRESS NOTES
Patient received into room 369. Report received from [facility]. Patient condition stable. FOB and  bedside. 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 Jose M Squires paged at 12 and case reviewed.   Per reji Almendarez

## 2019-10-27 NOTE — PROGRESS NOTES
Patient c/o pain in rectum while sitting. Feels something coming out. No heavy bleeding. Pain controlled with oral pain meds. AFEB VSS  FF  Perineum: no palpable hematoma + thrombosed hemorroid    PPD 1.5 with normal accuchecks - will d/c. Hydrocortisone

## 2019-10-28 VITALS
TEMPERATURE: 98 F | RESPIRATION RATE: 18 BRPM | DIASTOLIC BLOOD PRESSURE: 81 MMHG | HEART RATE: 93 BPM | OXYGEN SATURATION: 97 % | SYSTOLIC BLOOD PRESSURE: 131 MMHG

## 2019-10-28 RX ORDER — IBUPROFEN 600 MG/1
600 TABLET ORAL EVERY 4 HOURS PRN
Qty: 30 TABLET | Refills: 0 | Status: SHIPPED | OUTPATIENT
Start: 2019-10-28 | End: 2020-12-28

## 2019-10-28 RX ORDER — ACETAMINOPHEN 325 MG/1
325 TABLET ORAL EVERY 6 HOURS PRN
Qty: 30 TABLET | Refills: 0 | Status: SHIPPED | OUTPATIENT
Start: 2019-10-28 | End: 2019-12-24

## 2019-10-28 NOTE — LACTATION NOTE
LACTATION NOTE - MOTHER      Evaluation Type: Inpatient    Problems identified  Problems identified: Knowledge deficit; Nipple pain  Problems Identified Other: with feeding    Maternal history  Maternal history: Gestational diabetes  Other/comment: oral hyp

## 2019-10-28 NOTE — DISCHARGE SUMMARY
[facility]    Discharge Summary    Semaj Smith Patient Status:  Inpatient    1985 MRN [id number]   Location [facility] 3SE Attending Tara Branch MD   [facility] Day # 4 PCP Gisell Lisa MD     Date of Admission: 10/24/201 [phone] 13 [address]    Phone:  [phone]   · acetaminophen 325 MG Tabs  · Hydrocortisone Acetate 25 MG Supp  · ibuprofen 600 MG Tabs                      Discharge Diet: General diet    Discharge Activit Ibuprofen   · Colace or Metamucil for Constipation  · Lanolin for dry nipples  · Tucks, Witch Hazel and Epifoam for vaginal/perineum discomfort. · Drink a full glass of water with oral medication and take as directed.     Wound Care  The following instruc your leg/calf  · Please call ([phone]). If your call is made after office hours, a physician will be available to help you. There is always a provider covering our patients.     Thank you for coming to [facility] to start your new fami

## 2019-10-28 NOTE — PLAN OF CARE
FOCUS: MOM DISCHARGE    D: DISCHARGE ORDER RECEIVED FROM MD Byrd    A: POSTPARTUM DISCHARGE FOLDER GIVEN, DISCHARGE MEDICATION FORM REVIEWED, SIGNED AND GIVEN TO PATIENT. ID BAND MATCHED WITH INFANT BAND.  PATIENT INFORMED WHEN TO MAKE FOLLOW UP APPOINTMEN

## 2019-10-28 NOTE — PROGRESS NOTES
[address]  Obstetrics and Gynecology    OB/GYN: Postpartum Progress Note     SUBJECTIVE:  Patient is a 36year old  female who is s/p . She is PPD# 2. Doing well. Denies fever, chills, N, V, chest pain and SOB.  Bleeding h

## 2019-11-04 ENCOUNTER — PATIENT MESSAGE (OUTPATIENT)
Dept: OBGYN CLINIC | Facility: CLINIC | Age: 34
End: 2019-11-04

## 2019-11-04 NOTE — TELEPHONE ENCOUNTER
From: Cora Chappell  To:  Michael Collins MD  Sent: 11/4/2019 7:13 AM CST  Subject: Visit Follow-up Question    Hi Doctor,    I have haemorrhoid pain and had taken two suppositories from hospital prescribed by you, after that I haven't taken any med

## 2019-11-14 ENCOUNTER — TELEPHONE (OUTPATIENT)
Dept: OBGYN CLINIC | Facility: CLINIC | Age: 34
End: 2019-11-14

## 2019-11-14 NOTE — PROGRESS NOTES
Patient presents 3 W after , accompanied by her . She is having a lot of difficulty with the baby. The baby is crying a lot, having difficulty latching and difficult to soothe.  Darline Grant is pumping her breasts every 2 hours to get breast milk and have been able to laugh and see the funny side of things: Not at all  I have looked forward with enjoyment to things: Hardly at all  I have blamed myself unnecessarily when things went wrong: Yes, most of the time  I have been anxious or worried for no goo

## 2019-11-15 ENCOUNTER — TELEPHONE (OUTPATIENT)
Dept: OBGYN CLINIC | Facility: CLINIC | Age: 34
End: 2019-11-15

## 2019-11-15 NOTE — TELEPHONE ENCOUNTER
Hannah Rivera MS, LCPC called the office to say that pt cancelled her appointment for today because she didn't want to go without her  and didn't want to take an Lana.      Ubaldo attempted to call and text pt to offer her a follow up visit nex

## 2019-11-15 NOTE — TELEPHONE ENCOUNTER
This RN was advised today in office by the triage RN that this pt scored a 28 on her EPDS screening while being seen by one of our OBGYN physicians.  The MA that was rooming this pt immediately notified the triage RN to assist with this pt's unstable mental

## 2019-11-15 NOTE — TELEPHONE ENCOUNTER
[employer] Miguel notified that this RN was not able to get through to pt but that a voicemail was left and a MyChart message.   [employer] will continue to try to reach pt today and tomorrow and can offer 2 different times for her to come in to be seen by Dhruv Gomez

## 2019-11-15 NOTE — TELEPHONE ENCOUNTER
Called pt but no answer. Called and spoke with pt's . He is at work today and his mother in law in with Kandice and the baby.  He spoke very directly to his MIL about the seriousness of her health and told her that she needs to take complete care of the

## 2019-11-17 ENCOUNTER — TELEPHONE (OUTPATIENT)
Dept: OBGYN CLINIC | Facility: CLINIC | Age: 34
End: 2019-11-17

## 2019-11-17 NOTE — TELEPHONE ENCOUNTER
Late entry:    I had contacted this pt on Friday 11/15/2019 via text. She stated she was doing fine, had used the xanax and was able to sleep better. She felt better that her  shayne be in the home to help her for the weekend.  I also asked if she was

## 2019-11-20 ENCOUNTER — TELEPHONE (OUTPATIENT)
Dept: OBGYN CLINIC | Facility: CLINIC | Age: 34
End: 2019-11-20

## 2019-11-20 NOTE — TELEPHONE ENCOUNTER
Received text message from pt that pharmacy still had not filled zoloft which had been written 5 days ago. I called the pharmacy and they said they sent my office notification yesterday which was not responded to by staff.  The problem is that her insuranc

## 2019-12-13 ENCOUNTER — NURSE ONLY (OUTPATIENT)
Dept: LACTATION | Facility: HOSPITAL | Age: 34
End: 2019-12-13
Attending: OBSTETRICS & GYNECOLOGY
Payer: COMMERCIAL

## 2019-12-13 DIAGNOSIS — O92.79 POOR LATCH ON, POSTPARTUM: Primary | ICD-10-CM

## 2019-12-13 PROCEDURE — 99213 OFFICE O/P EST LOW 20 MIN: CPT

## 2019-12-24 ENCOUNTER — POSTPARTUM (OUTPATIENT)
Dept: OBGYN CLINIC | Facility: CLINIC | Age: 34
End: 2019-12-24
Payer: COMMERCIAL

## 2019-12-24 VITALS
HEIGHT: 62 IN | BODY MASS INDEX: 26.31 KG/M2 | DIASTOLIC BLOOD PRESSURE: 74 MMHG | SYSTOLIC BLOOD PRESSURE: 116 MMHG | WEIGHT: 143 LBS

## 2019-12-24 DIAGNOSIS — Z30.09 FAMILY PLANNING: Primary | ICD-10-CM

## 2019-12-24 RX ORDER — ACETAMINOPHEN AND CODEINE PHOSPHATE 120; 12 MG/5ML; MG/5ML
0.35 SOLUTION ORAL DAILY
Qty: 3 PACKAGE | Refills: 3 | Status: SHIPPED | OUTPATIENT
Start: 2019-12-24 | End: 2020-12-23

## 2019-12-24 NOTE — PROGRESS NOTES
Here for PP depression FU s/p 10/26/2019 . Felt much better since her parents left and her 's parents have been much more supportive. Still taking zoloft. She recently had her period. Breast feeding and latching well.      Last Postpartum Depress

## 2021-05-25 ENCOUNTER — APPOINTMENT (OUTPATIENT)
Dept: OBGYN CLINIC | Facility: CLINIC | Age: 36
End: 2021-05-25
Payer: COMMERCIAL

## 2021-05-25 ENCOUNTER — LAB ENCOUNTER (OUTPATIENT)
Dept: LAB | Facility: HOSPITAL | Age: 36
End: 2021-05-25
Attending: OBSTETRICS & GYNECOLOGY
Payer: COMMERCIAL

## 2021-05-25 ENCOUNTER — ULTRASOUND ENCOUNTER (OUTPATIENT)
Dept: OBGYN CLINIC | Facility: CLINIC | Age: 36
End: 2021-05-25
Payer: COMMERCIAL

## 2021-05-25 ENCOUNTER — INITIAL PRENATAL (OUTPATIENT)
Dept: OBGYN CLINIC | Facility: CLINIC | Age: 36
End: 2021-05-25
Payer: COMMERCIAL

## 2021-05-25 VITALS
DIASTOLIC BLOOD PRESSURE: 76 MMHG | BODY MASS INDEX: 24.27 KG/M2 | WEIGHT: 151 LBS | SYSTOLIC BLOOD PRESSURE: 120 MMHG | HEIGHT: 66 IN

## 2021-05-25 DIAGNOSIS — Z34.02 FIRST PREGNANCY, SECOND TRIMESTER: ICD-10-CM

## 2021-05-25 DIAGNOSIS — Z34.81 PRENATAL CARE, SUBSEQUENT PREGNANCY IN FIRST TRIMESTER: Primary | ICD-10-CM

## 2021-05-25 DIAGNOSIS — O36.80X0 PREGNANCY WITH INCONCLUSIVE FETAL VIABILITY, SINGLE OR UNSPECIFIED FETUS: Primary | ICD-10-CM

## 2021-05-25 DIAGNOSIS — Z34.81 PRENATAL CARE, SUBSEQUENT PREGNANCY IN FIRST TRIMESTER: ICD-10-CM

## 2021-05-25 DIAGNOSIS — O09.521 AMA (ADVANCED MATERNAL AGE) MULTIGRAVIDA 35+, FIRST TRIMESTER: ICD-10-CM

## 2021-05-25 DIAGNOSIS — Z12.4 PAPANICOLAOU SMEAR FOR CERVICAL CANCER SCREENING: ICD-10-CM

## 2021-05-25 PROCEDURE — 87491 CHLMYD TRACH DNA AMP PROBE: CPT | Performed by: OBSTETRICS & GYNECOLOGY

## 2021-05-25 PROCEDURE — 83036 HEMOGLOBIN GLYCOSYLATED A1C: CPT

## 2021-05-25 PROCEDURE — 85025 COMPLETE CBC W/AUTO DIFF WBC: CPT

## 2021-05-25 PROCEDURE — 87591 N.GONORRHOEAE DNA AMP PROB: CPT | Performed by: OBSTETRICS & GYNECOLOGY

## 2021-05-25 PROCEDURE — 86762 RUBELLA ANTIBODY: CPT

## 2021-05-25 PROCEDURE — 3008F BODY MASS INDEX DOCD: CPT | Performed by: OBSTETRICS & GYNECOLOGY

## 2021-05-25 PROCEDURE — 3078F DIAST BP <80 MM HG: CPT | Performed by: OBSTETRICS & GYNECOLOGY

## 2021-05-25 PROCEDURE — 3074F SYST BP LT 130 MM HG: CPT | Performed by: OBSTETRICS & GYNECOLOGY

## 2021-05-25 PROCEDURE — 87340 HEPATITIS B SURFACE AG IA: CPT

## 2021-05-25 PROCEDURE — 81002 URINALYSIS NONAUTO W/O SCOPE: CPT | Performed by: OBSTETRICS & GYNECOLOGY

## 2021-05-25 PROCEDURE — 86780 TREPONEMA PALLIDUM: CPT

## 2021-05-25 PROCEDURE — 88175 CYTOPATH C/V AUTO FLUID REDO: CPT | Performed by: OBSTETRICS & GYNECOLOGY

## 2021-05-25 PROCEDURE — 86900 BLOOD TYPING SEROLOGIC ABO: CPT

## 2021-05-25 PROCEDURE — 86850 RBC ANTIBODY SCREEN: CPT

## 2021-05-25 PROCEDURE — 86901 BLOOD TYPING SEROLOGIC RH(D): CPT

## 2021-05-25 PROCEDURE — 87086 URINE CULTURE/COLONY COUNT: CPT | Performed by: OBSTETRICS & GYNECOLOGY

## 2021-05-25 PROCEDURE — 84443 ASSAY THYROID STIM HORMONE: CPT | Performed by: OBSTETRICS & GYNECOLOGY

## 2021-05-25 PROCEDURE — 76801 OB US < 14 WKS SINGLE FETUS: CPT | Performed by: OBSTETRICS & GYNECOLOGY

## 2021-05-25 PROCEDURE — 36415 COLL VENOUS BLD VENIPUNCTURE: CPT

## 2021-05-25 PROCEDURE — 87389 HIV-1 AG W/HIV-1&-2 AB AG IA: CPT

## 2021-05-25 RX ORDER — PRENATAL VIT/IRON FUM/FOLIC AC 27MG-0.8MG
1 TABLET ORAL DAILY
COMMUNITY

## 2021-05-25 NOTE — PROGRESS NOTES
transabdominal us performed  single viable iup at 7w1d  ys present  atk=972 bpm  bilateral ovaries are wnls had a vaginal delivery with her last baby her periods are 30 to 31 days apart no bleeding since she has been pregnant she complains of some constipa

## 2021-06-22 ENCOUNTER — TELEPHONE (OUTPATIENT)
Dept: OBGYN CLINIC | Facility: CLINIC | Age: 36
End: 2021-06-22

## 2021-06-22 ENCOUNTER — ROUTINE PRENATAL (OUTPATIENT)
Dept: OBGYN CLINIC | Facility: CLINIC | Age: 36
End: 2021-06-22
Payer: COMMERCIAL

## 2021-06-22 VITALS
HEART RATE: 92 BPM | DIASTOLIC BLOOD PRESSURE: 72 MMHG | HEIGHT: 66 IN | BODY MASS INDEX: 24.11 KG/M2 | WEIGHT: 150 LBS | SYSTOLIC BLOOD PRESSURE: 118 MMHG

## 2021-06-22 DIAGNOSIS — Z34.91 ENCOUNTER FOR SUPERVISION OF NORMAL PREGNANCY IN FIRST TRIMESTER, UNSPECIFIED GRAVIDITY: Primary | ICD-10-CM

## 2021-06-22 DIAGNOSIS — O24.415 GESTATIONAL DIABETES MELLITUS (GDM) IN THIRD TRIMESTER CONTROLLED ON ORAL HYPOGLYCEMIC DRUG: ICD-10-CM

## 2021-06-22 DIAGNOSIS — O09.521 AMA (ADVANCED MATERNAL AGE) MULTIGRAVIDA 35+, FIRST TRIMESTER: ICD-10-CM

## 2021-06-22 DIAGNOSIS — O34.10 UTERINE FIBROID IN PREGNANCY: ICD-10-CM

## 2021-06-22 DIAGNOSIS — D25.9 UTERINE FIBROID IN PREGNANCY: ICD-10-CM

## 2021-06-22 PROCEDURE — 3074F SYST BP LT 130 MM HG: CPT | Performed by: OBSTETRICS & GYNECOLOGY

## 2021-06-22 PROCEDURE — 3078F DIAST BP <80 MM HG: CPT | Performed by: OBSTETRICS & GYNECOLOGY

## 2021-06-22 PROCEDURE — 81002 URINALYSIS NONAUTO W/O SCOPE: CPT | Performed by: OBSTETRICS & GYNECOLOGY

## 2021-06-22 PROCEDURE — 3008F BODY MASS INDEX DOCD: CPT | Performed by: OBSTETRICS & GYNECOLOGY

## 2021-06-22 NOTE — PROGRESS NOTES
Vaccination was done in March. She wishes to have cell free DNA screening. Her hemoglobin A1c was normal she forgot to check some sugars she was encouraged. Cell free DNA.

## 2021-06-29 LAB
AMB EXT MYRIAD TRISOMY 13: NEGATIVE
AMB EXT MYRIAD TRISOMY 18: NEGATIVE
AMB EXT MYRIAD TRISOMY 21: NEGATIVE

## 2021-07-01 ENCOUNTER — TELEPHONE (OUTPATIENT)
Dept: OBGYN CLINIC | Facility: CLINIC | Age: 36
End: 2021-07-01

## 2021-07-01 NOTE — TELEPHONE ENCOUNTER
Patient called stating she wanted to have gender placed in envelope to  at the . Predicted fetal sex placed in envelope and placed at  for .

## 2021-08-04 ENCOUNTER — ROUTINE PRENATAL (OUTPATIENT)
Dept: OBGYN CLINIC | Facility: CLINIC | Age: 36
End: 2021-08-04
Payer: COMMERCIAL

## 2021-08-04 VITALS
HEART RATE: 100 BPM | SYSTOLIC BLOOD PRESSURE: 110 MMHG | DIASTOLIC BLOOD PRESSURE: 54 MMHG | HEIGHT: 66 IN | BODY MASS INDEX: 25.23 KG/M2 | WEIGHT: 157 LBS

## 2021-08-04 DIAGNOSIS — O09.521 AMA (ADVANCED MATERNAL AGE) MULTIGRAVIDA 35+, FIRST TRIMESTER: Primary | ICD-10-CM

## 2021-08-04 LAB
GLUCOSE (URINE DIPSTICK): NEGATIVE MG/DL
MULTISTIX LOT#: NORMAL NUMERIC

## 2021-08-04 PROCEDURE — 3074F SYST BP LT 130 MM HG: CPT | Performed by: STUDENT IN AN ORGANIZED HEALTH CARE EDUCATION/TRAINING PROGRAM

## 2021-08-04 PROCEDURE — 3008F BODY MASS INDEX DOCD: CPT | Performed by: STUDENT IN AN ORGANIZED HEALTH CARE EDUCATION/TRAINING PROGRAM

## 2021-08-04 PROCEDURE — 3078F DIAST BP <80 MM HG: CPT | Performed by: STUDENT IN AN ORGANIZED HEALTH CARE EDUCATION/TRAINING PROGRAM

## 2021-08-04 PROCEDURE — 81002 URINALYSIS NONAUTO W/O SCOPE: CPT | Performed by: STUDENT IN AN ORGANIZED HEALTH CARE EDUCATION/TRAINING PROGRAM

## 2021-08-04 NOTE — PATIENT INSTRUCTIONS
AFP Level     There is an optional blood test that we can perform on you called \"AFP level. \" It is a chemical in the bloodstream produced by the pregnancy. It is done between 15-20 weeks gestation.  The ideal time for testing is actually 16-18 weeks gesta

## 2021-08-04 NOTE — PROGRESS NOTES
29year old  at 17w2d today    Having itching around ankles, wondering if pregnancy related. Has small raised erythematous papules at ankles. Started after going camping. Rec OTC cortisone cream for mosquito bites. Denies cramping, VB.   Has check

## 2021-08-31 NOTE — PROGRESS NOTES
OMAR - 21w2d  Doing well overall.  Pelvic pain from time to time with fetal mvmt but mild     EDC by 7w1d US not c/w LMP  -s/p COVID vaccine  -declined AFP    1) AMA   -NIPT neg, BOY  -L2 US with MFM normal  -growth US at 32wk (scheduled 11/19 with MFM)  -NS

## 2021-09-01 ENCOUNTER — ROUTINE PRENATAL (OUTPATIENT)
Dept: OBGYN CLINIC | Facility: CLINIC | Age: 36
End: 2021-09-01
Payer: COMMERCIAL

## 2021-09-01 VITALS
HEART RATE: 91 BPM | HEIGHT: 67 IN | WEIGHT: 159.63 LBS | BODY MASS INDEX: 25.06 KG/M2 | SYSTOLIC BLOOD PRESSURE: 102 MMHG | DIASTOLIC BLOOD PRESSURE: 66 MMHG

## 2021-09-01 DIAGNOSIS — Z34.92 ENCOUNTER FOR SUPERVISION OF NORMAL PREGNANCY IN SECOND TRIMESTER, UNSPECIFIED GRAVIDITY: Primary | ICD-10-CM

## 2021-09-01 LAB
GLUCOSE (URINE DIPSTICK): NEGATIVE MG/DL
MULTISTIX LOT#: NORMAL NUMERIC
PROTEIN (URINE DIPSTICK): NEGATIVE MG/DL

## 2021-09-01 PROCEDURE — 3074F SYST BP LT 130 MM HG: CPT | Performed by: STUDENT IN AN ORGANIZED HEALTH CARE EDUCATION/TRAINING PROGRAM

## 2021-09-01 PROCEDURE — 3008F BODY MASS INDEX DOCD: CPT | Performed by: STUDENT IN AN ORGANIZED HEALTH CARE EDUCATION/TRAINING PROGRAM

## 2021-09-01 PROCEDURE — 3078F DIAST BP <80 MM HG: CPT | Performed by: STUDENT IN AN ORGANIZED HEALTH CARE EDUCATION/TRAINING PROGRAM

## 2021-09-01 PROCEDURE — 81002 URINALYSIS NONAUTO W/O SCOPE: CPT | Performed by: STUDENT IN AN ORGANIZED HEALTH CARE EDUCATION/TRAINING PROGRAM

## 2021-09-30 ENCOUNTER — ROUTINE PRENATAL (OUTPATIENT)
Dept: OBGYN CLINIC | Facility: CLINIC | Age: 36
End: 2021-09-30
Payer: COMMERCIAL

## 2021-09-30 VITALS
HEIGHT: 67 IN | WEIGHT: 161 LBS | BODY MASS INDEX: 25.27 KG/M2 | DIASTOLIC BLOOD PRESSURE: 70 MMHG | SYSTOLIC BLOOD PRESSURE: 104 MMHG

## 2021-09-30 DIAGNOSIS — O99.891 HISTORY OF POSTPARTUM DEPRESSION, CURRENTLY PREGNANT IN SECOND TRIMESTER: ICD-10-CM

## 2021-09-30 DIAGNOSIS — Z34.82 PRENATAL CARE, SUBSEQUENT PREGNANCY, SECOND TRIMESTER: ICD-10-CM

## 2021-09-30 DIAGNOSIS — O99.612 CONSTIPATION DURING PREGNANCY IN SECOND TRIMESTER: ICD-10-CM

## 2021-09-30 DIAGNOSIS — K59.00 CONSTIPATION DURING PREGNANCY IN SECOND TRIMESTER: ICD-10-CM

## 2021-09-30 DIAGNOSIS — Z23 NEED FOR VACCINATION: ICD-10-CM

## 2021-09-30 DIAGNOSIS — O09.522 AMA (ADVANCED MATERNAL AGE) MULTIGRAVIDA 35+, SECOND TRIMESTER: Primary | ICD-10-CM

## 2021-09-30 DIAGNOSIS — Z86.59 HISTORY OF POSTPARTUM DEPRESSION, CURRENTLY PREGNANT IN SECOND TRIMESTER: ICD-10-CM

## 2021-09-30 DIAGNOSIS — O09.292 HISTORY OF GESTATIONAL DIABETES IN PRIOR PREGNANCY, CURRENTLY PREGNANT IN SECOND TRIMESTER: ICD-10-CM

## 2021-09-30 DIAGNOSIS — Z86.32 HISTORY OF GESTATIONAL DIABETES IN PRIOR PREGNANCY, CURRENTLY PREGNANT IN SECOND TRIMESTER: ICD-10-CM

## 2021-09-30 DIAGNOSIS — K64.9 HEMORRHOIDS, UNSPECIFIED HEMORRHOID TYPE: ICD-10-CM

## 2021-09-30 PROBLEM — Z34.03 PRIMIGRAVIDA, THIRD TRIMESTER: Status: RESOLVED | Noted: 2019-04-10 | Resolved: 2021-09-30

## 2021-09-30 PROBLEM — Z34.03 PRIMIGRAVIDA, THIRD TRIMESTER (HCC): Status: RESOLVED | Noted: 2019-04-10 | Resolved: 2021-09-30

## 2021-09-30 PROBLEM — O34.10 UTERINE FIBROID IN PREGNANCY: Status: RESOLVED | Noted: 2019-04-10 | Resolved: 2021-09-30

## 2021-09-30 PROBLEM — Z32.01 PREGNANCY EXAMINATION OR TEST, POSITIVE RESULT: Status: RESOLVED | Noted: 2019-03-18 | Resolved: 2021-09-30

## 2021-09-30 PROBLEM — Z34.90 ENCOUNTER FOR INDUCTION OF LABOR: Status: RESOLVED | Noted: 2019-10-26 | Resolved: 2021-09-30

## 2021-09-30 PROBLEM — Z34.90 ENCOUNTER FOR INDUCTION OF LABOR (HCC): Status: RESOLVED | Noted: 2019-10-26 | Resolved: 2021-09-30

## 2021-09-30 PROBLEM — Z32.01 PREGNANCY EXAMINATION OR TEST, POSITIVE RESULT (HCC): Status: RESOLVED | Noted: 2019-03-18 | Resolved: 2021-09-30

## 2021-09-30 PROBLEM — O24.415 GESTATIONAL DIABETES MELLITUS (GDM) IN THIRD TRIMESTER CONTROLLED ON ORAL HYPOGLYCEMIC DRUG: Status: RESOLVED | Noted: 2019-09-17 | Resolved: 2021-09-30

## 2021-09-30 PROBLEM — Z28.39 RUBELLA NON-IMMUNE STATUS, ANTEPARTUM (HCC): Status: RESOLVED | Noted: 2019-04-24 | Resolved: 2021-09-30

## 2021-09-30 PROBLEM — Z34.90 PREGNANCY: Status: RESOLVED | Noted: 2019-08-16 | Resolved: 2021-09-30

## 2021-09-30 PROBLEM — O09.899 RUBELLA NON-IMMUNE STATUS, ANTEPARTUM (HCC): Status: RESOLVED | Noted: 2019-04-24 | Resolved: 2021-09-30

## 2021-09-30 PROBLEM — D25.9 UTERINE FIBROID IN PREGNANCY: Status: RESOLVED | Noted: 2019-04-10 | Resolved: 2021-09-30

## 2021-09-30 PROBLEM — O24.415 GESTATIONAL DIABETES MELLITUS (GDM) IN THIRD TRIMESTER CONTROLLED ON ORAL HYPOGLYCEMIC DRUG (HCC): Status: RESOLVED | Noted: 2019-09-17 | Resolved: 2021-09-30

## 2021-09-30 PROBLEM — Z28.3 RUBELLA NON-IMMUNE STATUS, ANTEPARTUM: Status: RESOLVED | Noted: 2019-04-24 | Resolved: 2021-09-30

## 2021-09-30 PROBLEM — D25.9 UTERINE FIBROID IN PREGNANCY (HCC): Status: RESOLVED | Noted: 2019-04-10 | Resolved: 2021-09-30

## 2021-09-30 PROBLEM — O09.899 RUBELLA NON-IMMUNE STATUS, ANTEPARTUM: Status: RESOLVED | Noted: 2019-04-24 | Resolved: 2021-09-30

## 2021-09-30 PROBLEM — Z34.90 PREGNANCY (HCC): Status: RESOLVED | Noted: 2019-08-16 | Resolved: 2021-09-30

## 2021-09-30 PROBLEM — O34.10 UTERINE FIBROID IN PREGNANCY (HCC): Status: RESOLVED | Noted: 2019-04-10 | Resolved: 2021-09-30

## 2021-09-30 PROBLEM — Z28.39 RUBELLA NON-IMMUNE STATUS, ANTEPARTUM: Status: RESOLVED | Noted: 2019-04-24 | Resolved: 2021-09-30

## 2021-09-30 PROCEDURE — 3008F BODY MASS INDEX DOCD: CPT | Performed by: OBSTETRICS & GYNECOLOGY

## 2021-09-30 PROCEDURE — 3074F SYST BP LT 130 MM HG: CPT | Performed by: OBSTETRICS & GYNECOLOGY

## 2021-09-30 PROCEDURE — 81002 URINALYSIS NONAUTO W/O SCOPE: CPT | Performed by: OBSTETRICS & GYNECOLOGY

## 2021-09-30 PROCEDURE — 3078F DIAST BP <80 MM HG: CPT | Performed by: OBSTETRICS & GYNECOLOGY

## 2021-09-30 PROCEDURE — 90471 IMMUNIZATION ADMIN: CPT | Performed by: OBSTETRICS & GYNECOLOGY

## 2021-09-30 PROCEDURE — 90686 IIV4 VACC NO PRSV 0.5 ML IM: CPT | Performed by: OBSTETRICS & GYNECOLOGY

## 2021-09-30 NOTE — PATIENT INSTRUCTIONS
Please have 1 hour glucose test, hemoglobin A1c, CBC, HIV at 27w0d or beyond. You are 25w3d today, 9/30/2021. Miralax - once to twice a day. Take for at least 2 days, then try a dose of the milk of mag. Milk of magnesia.     Then when cleared out star

## 2021-09-30 NOTE — PROGRESS NOTES
OMAR   +FM. No contractions, leaking fluid, vaginal bleeding, headache, vision changes, epigastric pain. Very severe constipation & hemorrhoids. Hemorrhoids since last delivery. Used to have BM daily. Once 1 time every 3 days. Not emptying. Very stool.  Pebb

## 2021-10-22 ENCOUNTER — LAB ENCOUNTER (OUTPATIENT)
Dept: LAB | Age: 36
End: 2021-10-22
Attending: SOCIAL WORKER
Payer: COMMERCIAL

## 2021-10-22 DIAGNOSIS — Z34.82 PRENATAL CARE, SUBSEQUENT PREGNANCY, SECOND TRIMESTER: ICD-10-CM

## 2021-10-22 DIAGNOSIS — O09.292 HISTORY OF GESTATIONAL DIABETES IN PRIOR PREGNANCY, CURRENTLY PREGNANT IN SECOND TRIMESTER: ICD-10-CM

## 2021-10-22 DIAGNOSIS — Z86.32 HISTORY OF GESTATIONAL DIABETES IN PRIOR PREGNANCY, CURRENTLY PREGNANT IN SECOND TRIMESTER: ICD-10-CM

## 2021-10-22 PROBLEM — O99.810 ABNORMAL MATERNAL GLUCOSE TOLERANCE, ANTEPARTUM: Status: ACTIVE | Noted: 2021-10-22

## 2021-10-22 PROBLEM — O99.810 ABNORMAL MATERNAL GLUCOSE TOLERANCE, ANTEPARTUM (HCC): Status: ACTIVE | Noted: 2021-10-22

## 2021-10-22 PROCEDURE — 82950 GLUCOSE TEST: CPT | Performed by: OBSTETRICS & GYNECOLOGY

## 2021-10-22 PROCEDURE — 83036 HEMOGLOBIN GLYCOSYLATED A1C: CPT | Performed by: OBSTETRICS & GYNECOLOGY

## 2021-10-22 PROCEDURE — 87389 HIV-1 AG W/HIV-1&-2 AB AG IA: CPT | Performed by: OBSTETRICS & GYNECOLOGY

## 2021-10-22 PROCEDURE — 85025 COMPLETE CBC W/AUTO DIFF WBC: CPT | Performed by: OBSTETRICS & GYNECOLOGY

## 2021-10-25 NOTE — PROGRESS NOTES
Spoke with pt. Discussed results. She is aware she needs to go for the 3 hour GTT. I told her the order was sent and to call central scheduling to get that scheduled. Verbalized understanding.

## 2021-11-05 ENCOUNTER — TELEPHONE (OUTPATIENT)
Dept: OBGYN CLINIC | Facility: CLINIC | Age: 36
End: 2021-11-05

## 2021-11-05 ENCOUNTER — LAB ENCOUNTER (OUTPATIENT)
Dept: LAB | Age: 36
End: 2021-11-05
Attending: OBSTETRICS & GYNECOLOGY
Payer: COMMERCIAL

## 2021-11-05 DIAGNOSIS — O24.410 DIET CONTROLLED GESTATIONAL DIABETES MELLITUS (GDM) IN THIRD TRIMESTER: Primary | ICD-10-CM

## 2021-11-05 DIAGNOSIS — O99.810 ABNORMAL MATERNAL GLUCOSE TOLERANCE, ANTEPARTUM: ICD-10-CM

## 2021-11-05 PROCEDURE — 82962 GLUCOSE BLOOD TEST: CPT | Performed by: OBSTETRICS & GYNECOLOGY

## 2021-11-05 PROCEDURE — 82951 GLUCOSE TOLERANCE TEST (GTT): CPT | Performed by: OBSTETRICS & GYNECOLOGY

## 2021-11-05 RX ORDER — BLOOD-GLUCOSE METER
1 EACH MISCELLANEOUS ONCE
Qty: 1 KIT | Refills: 0 | Status: SHIPPED | OUTPATIENT
Start: 2021-11-05 | End: 2021-11-05

## 2021-11-05 RX ORDER — LANCETS 33 GAUGE
1 EACH MISCELLANEOUS 4 TIMES DAILY
Qty: 100 EACH | Refills: 3 | Status: SHIPPED | OUTPATIENT
Start: 2021-11-05 | End: 2022-11-05

## 2021-11-05 RX ORDER — BLOOD SUGAR DIAGNOSTIC
1 STRIP MISCELLANEOUS 4 TIMES DAILY
Qty: 100 STRIP | Refills: 3 | Status: SHIPPED | OUTPATIENT
Start: 2021-11-05 | End: 2021-12-29

## 2021-11-06 ENCOUNTER — TELEMEDICINE (OUTPATIENT)
Dept: ENDOCRINOLOGY CLINIC | Facility: CLINIC | Age: 36
End: 2021-11-06

## 2021-11-06 DIAGNOSIS — O24.410 DIET CONTROLLED GESTATIONAL DIABETES MELLITUS (GDM) IN THIRD TRIMESTER: Primary | ICD-10-CM

## 2021-11-06 PROCEDURE — 97802 MEDICAL NUTRITION INDIV IN: CPT | Performed by: DIETITIAN, REGISTERED

## 2021-11-06 NOTE — PROGRESS NOTES
Eleni Batch   7/5/1985 was seen for Gestational Diabetes Counseling: Individual     Date: 11/6/2021  Referring Provider: Dr. Ace Escalera  Start time: 8:00 End time: 8:30    Due to COVID-19 ACTION PLAN, the patient's office visit was conducted via real-time in discussed.   Reviewed types of recommended activity, duration, precautions, and when to call MD.    Monitoring:  Instructed on how to use glucose monitor/proper lancet disposal. Testing schedules are:   Fastin-95 mg/dL, Call MD is >105 md/dL twice in

## 2021-11-11 ENCOUNTER — ROUTINE PRENATAL (OUTPATIENT)
Dept: OBGYN CLINIC | Facility: CLINIC | Age: 36
End: 2021-11-11
Payer: COMMERCIAL

## 2021-11-11 VITALS
WEIGHT: 165.19 LBS | SYSTOLIC BLOOD PRESSURE: 102 MMHG | DIASTOLIC BLOOD PRESSURE: 68 MMHG | HEIGHT: 67 IN | BODY MASS INDEX: 25.93 KG/M2 | HEART RATE: 88 BPM

## 2021-11-11 DIAGNOSIS — Z86.59 HISTORY OF POSTPARTUM DEPRESSION, CURRENTLY PREGNANT IN THIRD TRIMESTER: ICD-10-CM

## 2021-11-11 DIAGNOSIS — O09.523 AMA (ADVANCED MATERNAL AGE) MULTIGRAVIDA 35+, THIRD TRIMESTER: Primary | ICD-10-CM

## 2021-11-11 DIAGNOSIS — Z23 NEED FOR TDAP VACCINATION: ICD-10-CM

## 2021-11-11 DIAGNOSIS — O99.891 HISTORY OF POSTPARTUM DEPRESSION, CURRENTLY PREGNANT IN THIRD TRIMESTER: ICD-10-CM

## 2021-11-11 DIAGNOSIS — Z34.83 PRENATAL CARE, SUBSEQUENT PREGNANCY IN THIRD TRIMESTER: ICD-10-CM

## 2021-11-11 DIAGNOSIS — O24.410 DIET CONTROLLED GESTATIONAL DIABETES MELLITUS (GDM) IN THIRD TRIMESTER: ICD-10-CM

## 2021-11-11 PROBLEM — O99.613 CONSTIPATION DURING PREGNANCY IN THIRD TRIMESTER (HCC): Status: ACTIVE | Noted: 2021-09-30

## 2021-11-11 PROBLEM — K59.00 CONSTIPATION DURING PREGNANCY IN THIRD TRIMESTER (HCC): Status: ACTIVE | Noted: 2021-09-30

## 2021-11-11 PROBLEM — K59.00 CONSTIPATION DURING PREGNANCY IN THIRD TRIMESTER: Status: ACTIVE | Noted: 2021-09-30

## 2021-11-11 PROBLEM — O99.613 CONSTIPATION DURING PREGNANCY IN THIRD TRIMESTER: Status: ACTIVE | Noted: 2021-09-30

## 2021-11-11 PROCEDURE — 3008F BODY MASS INDEX DOCD: CPT | Performed by: OBSTETRICS & GYNECOLOGY

## 2021-11-11 PROCEDURE — 90715 TDAP VACCINE 7 YRS/> IM: CPT | Performed by: OBSTETRICS & GYNECOLOGY

## 2021-11-11 PROCEDURE — 3078F DIAST BP <80 MM HG: CPT | Performed by: OBSTETRICS & GYNECOLOGY

## 2021-11-11 PROCEDURE — 81002 URINALYSIS NONAUTO W/O SCOPE: CPT | Performed by: OBSTETRICS & GYNECOLOGY

## 2021-11-11 PROCEDURE — 3074F SYST BP LT 130 MM HG: CPT | Performed by: OBSTETRICS & GYNECOLOGY

## 2021-11-11 PROCEDURE — 90471 IMMUNIZATION ADMIN: CPT | Performed by: OBSTETRICS & GYNECOLOGY

## 2021-11-11 RX ORDER — BLOOD-GLUCOSE METER
1 EACH MISCELLANEOUS 4 TIMES DAILY
COMMUNITY
Start: 2021-11-05

## 2021-11-12 NOTE — PATIENT INSTRUCTIONS
Nasal saline  Nasal steroids (over the counter - Nasocort, Rhinocort)  Tums or Pepcid    Tdap (Tetanus, Diptheria, Pertussis)   -booster shot for pertussis (whooping cough)  -recommended by the CDC (Centers for Disease Control) for every pregnant woman in

## 2021-11-12 NOTE — PROGRESS NOTES
OMAR   +FM. No contractions, leaking fluid, vaginal bleeding. Very severe constipation & hemorrhoids. Hemorrhoids are better. Took Miralax & milk of magnesia once. After that was able to go. Now it is better. Stuffy nose but getting better.  Some heartburn a

## 2021-11-15 ENCOUNTER — HOSPITAL ENCOUNTER (OUTPATIENT)
Facility: HOSPITAL | Age: 36
Setting detail: OBSERVATION
Discharge: HOME | End: 2021-11-15
Attending: OBSTETRICS & GYNECOLOGY | Admitting: OBSTETRICS & GYNECOLOGY
Payer: COMMERCIAL

## 2021-11-15 ENCOUNTER — TELEPHONE (OUTPATIENT)
Dept: OBGYN CLINIC | Facility: CLINIC | Age: 36
End: 2021-11-15

## 2021-11-15 VITALS
SYSTOLIC BLOOD PRESSURE: 110 MMHG | TEMPERATURE: 98 F | DIASTOLIC BLOOD PRESSURE: 71 MMHG | HEART RATE: 93 BPM | RESPIRATION RATE: 16 BRPM

## 2021-11-15 PROBLEM — Z34.90 PREGNANCY: Status: ACTIVE | Noted: 2021-11-15

## 2021-11-15 PROBLEM — Z34.90 PREGNANCY (HCC): Status: ACTIVE | Noted: 2021-11-15

## 2021-11-15 PROCEDURE — 59025 FETAL NON-STRESS TEST: CPT | Performed by: OBSTETRICS & GYNECOLOGY

## 2021-11-15 NOTE — PROGRESS NOTES
+ fetal movement per pt and NST reactive with PO water hydration, orders received from [facility] to discharge pt home and follow up for the next scheduled appointment, Discharge instruction given and kick count instruction given. pt going home in stable condi

## 2021-11-15 NOTE — PROGRESS NOTES
Pt is a 29year old female [facility]. Patient presents with:  Decreased Fetal Movement     Pt is  32w0d intra-uterine pregnancy. History obtained, consents signed. Oriented to room, staff, and plan of care.   EFM and Cam[place] applied, PO

## 2021-11-15 NOTE — NST
Nonstress Test   Patient: Sola Menchaca    Gestation: 32w0d    NST:       Variability: Moderate           Accelerations: Yes           Decelerations: None            Baseline: 140 BPM           Uterine Irritability: No           Contractions: Not present

## 2021-11-15 NOTE — TELEPHONE ENCOUNTER
Pt states she had not felt much movement since yesterday. Pt states the baby felt very active up until yesterday.  Please advise

## 2021-11-15 NOTE — TELEPHONE ENCOUNTER
Per pt, she has only felt baby move x3 since last night. Has tried doing kick counts, laying on her side and eating dark chocolate which typically stimulates baby. Pt advised to go to L&D for evaluation. Understanding verbalized. L&D notified.

## 2021-11-16 ENCOUNTER — TELEPHONE (OUTPATIENT)
Dept: OBGYN CLINIC | Facility: CLINIC | Age: 36
End: 2021-11-16

## 2021-11-16 NOTE — TELEPHONE ENCOUNTER
Spoke with patient. She states her sugars are half good and half not good. Bedtime snack did not help much. With snack BS was 95 without it was 96. She only ate a piece of cheese for her snack.  Instructed her to try something a little bit more substantial

## 2021-11-16 NOTE — TELEPHONE ENCOUNTER
----- Message from Papito Sosa MD sent at 11/11/2021 11:46 PM CST -----  Regarding: Can you check in on patient on either Monday 11/15 or Tues 11/16 to see how adding bedtime snack in affected her fasting sugars & how she is doing with glucose control

## 2021-11-18 ENCOUNTER — TELEMEDICINE (OUTPATIENT)
Dept: ENDOCRINOLOGY CLINIC | Facility: CLINIC | Age: 36
End: 2021-11-18

## 2021-11-18 DIAGNOSIS — O24.410 DIET CONTROLLED GESTATIONAL DIABETES MELLITUS (GDM) IN THIRD TRIMESTER: Primary | ICD-10-CM

## 2021-11-18 PROCEDURE — 97803 MED NUTRITION INDIV SUBSEQ: CPT | Performed by: DIETITIAN, REGISTERED

## 2021-11-18 NOTE — PROGRESS NOTES
Enrike Roy  7/5/1985 was seen for Gestational Diabetes Education Follow-Up    11/18/2021    Referring Provider: Dr. Mike Montiel Start time: 8:00  End time: 8:15    Due to COVID-19 ACTION PLAN, the patient's office visit was conducted via real-time interactiv

## 2021-11-27 ENCOUNTER — ROUTINE PRENATAL (OUTPATIENT)
Dept: OBGYN CLINIC | Facility: CLINIC | Age: 36
End: 2021-11-27
Payer: COMMERCIAL

## 2021-11-27 ENCOUNTER — TELEPHONE (OUTPATIENT)
Dept: OBGYN CLINIC | Facility: CLINIC | Age: 36
End: 2021-11-27

## 2021-11-27 VITALS
WEIGHT: 166 LBS | SYSTOLIC BLOOD PRESSURE: 100 MMHG | BODY MASS INDEX: 26.06 KG/M2 | HEIGHT: 67 IN | HEART RATE: 77 BPM | DIASTOLIC BLOOD PRESSURE: 71 MMHG

## 2021-11-27 DIAGNOSIS — Z34.83 PRENATAL CARE, SUBSEQUENT PREGNANCY IN THIRD TRIMESTER: ICD-10-CM

## 2021-11-27 DIAGNOSIS — O09.523 AMA (ADVANCED MATERNAL AGE) MULTIGRAVIDA 35+, THIRD TRIMESTER: Primary | ICD-10-CM

## 2021-11-27 DIAGNOSIS — O24.410 DIET CONTROLLED GESTATIONAL DIABETES MELLITUS (GDM) IN THIRD TRIMESTER: ICD-10-CM

## 2021-11-27 DIAGNOSIS — O99.891 HISTORY OF POSTPARTUM DEPRESSION, CURRENTLY PREGNANT IN THIRD TRIMESTER: ICD-10-CM

## 2021-11-27 DIAGNOSIS — Z86.59 HISTORY OF POSTPARTUM DEPRESSION, CURRENTLY PREGNANT IN THIRD TRIMESTER: ICD-10-CM

## 2021-11-27 PROCEDURE — 3008F BODY MASS INDEX DOCD: CPT | Performed by: OBSTETRICS & GYNECOLOGY

## 2021-11-27 PROCEDURE — 81002 URINALYSIS NONAUTO W/O SCOPE: CPT | Performed by: OBSTETRICS & GYNECOLOGY

## 2021-11-27 PROCEDURE — 3074F SYST BP LT 130 MM HG: CPT | Performed by: OBSTETRICS & GYNECOLOGY

## 2021-11-27 PROCEDURE — 3078F DIAST BP <80 MM HG: CPT | Performed by: OBSTETRICS & GYNECOLOGY

## 2021-11-27 NOTE — TELEPHONE ENCOUNTER
Insurance covered breast pump was ordered for pt. Order form was faxed in with a copy of pt's insurance card.

## 2021-11-27 NOTE — PROGRESS NOTES
OMAR   +FM. No contractions, leaking fluid, vaginal bleeding, headache, vision changes, epigastric pain. 29year old  at [facility] by Rachid Soto not c/w LMP  A+  -cfDNA neg, BOY. AFP declined.    -COVID, Flu, Tdap done  -3rd trim HIV neg   -kevon

## 2021-11-30 ENCOUNTER — TELEPHONE (OUTPATIENT)
Dept: OBGYN CLINIC | Facility: CLINIC | Age: 36
End: 2021-11-30

## 2021-11-30 NOTE — TELEPHONE ENCOUNTER
Pt dropped off FMLA paperwork. Dr. Alicia Horan signed and clinicals were faxed to [facility] @ [phone] for STD and to Pt employer [employer] @ [phone] for FMLA.

## 2021-12-04 ENCOUNTER — APPOINTMENT (OUTPATIENT)
Dept: ULTRASOUND IMAGING | Facility: HOSPITAL | Age: 36
End: 2021-12-04
Attending: OBSTETRICS & GYNECOLOGY
Payer: COMMERCIAL

## 2021-12-04 ENCOUNTER — HOSPITAL ENCOUNTER (OUTPATIENT)
Facility: HOSPITAL | Age: 36
Setting detail: OBSERVATION
Discharge: HOME | End: 2021-12-04
Attending: OBSTETRICS & GYNECOLOGY | Admitting: OBSTETRICS & GYNECOLOGY
Payer: COMMERCIAL

## 2021-12-04 VITALS
TEMPERATURE: 98 F | HEART RATE: 78 BPM | RESPIRATION RATE: 16 BRPM | HEIGHT: 67 IN | DIASTOLIC BLOOD PRESSURE: 68 MMHG | WEIGHT: 165.38 LBS | SYSTOLIC BLOOD PRESSURE: 111 MMHG | BODY MASS INDEX: 25.96 KG/M2

## 2021-12-04 PROCEDURE — 76815 OB US LIMITED FETUS(S): CPT | Performed by: OBSTETRICS & GYNECOLOGY

## 2021-12-04 PROCEDURE — 59025 FETAL NON-STRESS TEST: CPT | Performed by: OBSTETRICS & GYNECOLOGY

## 2021-12-04 NOTE — PROGRESS NOTES
Pt is a  at 29 5/7 wk iup who is brought to room triage-3 for c/o of groin pain after a fall last night at approx . Pt reports slipping on water in the [place] and falling onto her left hip.  She states she did not hit her abdomen, Pt reports hitting her

## 2021-12-04 NOTE — NST
Nonstress Test   Patient: Miguel A Saha    Gestation: 34w5d    NST:       Variability: Moderate           Accelerations: Yes           Decelerations: None            Baseline: 140 BPM           Uterine Irritability: No           Contractions: Irregular

## 2021-12-06 ENCOUNTER — ULTRASOUND ENCOUNTER (OUTPATIENT)
Dept: PERINATAL CARE | Facility: HOSPITAL | Age: 36
End: 2021-12-06
Attending: OBSTETRICS & GYNECOLOGY
Payer: COMMERCIAL

## 2021-12-06 ENCOUNTER — APPOINTMENT (OUTPATIENT)
Dept: ULTRASOUND IMAGING | Facility: HOSPITAL | Age: 36
End: 2021-12-06
Attending: OBSTETRICS & GYNECOLOGY
Payer: COMMERCIAL

## 2021-12-06 ENCOUNTER — HOSPITAL ENCOUNTER (OUTPATIENT)
Facility: HOSPITAL | Age: 36
Setting detail: OBSERVATION
Discharge: HOME | End: 2021-12-06
Attending: OBSTETRICS & GYNECOLOGY | Admitting: OBSTETRICS & GYNECOLOGY
Payer: COMMERCIAL

## 2021-12-06 ENCOUNTER — ROUTINE PRENATAL (OUTPATIENT)
Dept: OBGYN CLINIC | Facility: CLINIC | Age: 36
End: 2021-12-06
Payer: COMMERCIAL

## 2021-12-06 VITALS
SYSTOLIC BLOOD PRESSURE: 100 MMHG | HEIGHT: 67 IN | DIASTOLIC BLOOD PRESSURE: 65 MMHG | WEIGHT: 167 LBS | HEART RATE: 111 BPM | BODY MASS INDEX: 26.21 KG/M2

## 2021-12-06 VITALS
RESPIRATION RATE: 14 BRPM | HEART RATE: 81 BPM | TEMPERATURE: 96 F | SYSTOLIC BLOOD PRESSURE: 110 MMHG | DIASTOLIC BLOOD PRESSURE: 68 MMHG

## 2021-12-06 DIAGNOSIS — O09.523 AMA (ADVANCED MATERNAL AGE) MULTIGRAVIDA 35+, THIRD TRIMESTER: ICD-10-CM

## 2021-12-06 DIAGNOSIS — O36.8190 DECREASED FETAL MOVEMENT AFFECTING MANAGEMENT OF PREGNANCY, ANTEPARTUM, SINGLE OR UNSPECIFIED FETUS: Primary | ICD-10-CM

## 2021-12-06 DIAGNOSIS — O36.8190 DECREASED FETAL MOVEMENT AFFECTING MANAGEMENT OF PREGNANCY, ANTEPARTUM, SINGLE OR UNSPECIFIED FETUS: ICD-10-CM

## 2021-12-06 DIAGNOSIS — O9A.213 TRAUMATIC INJURY DURING PREGNANCY IN THIRD TRIMESTER: ICD-10-CM

## 2021-12-06 DIAGNOSIS — O46.93 VAGINAL BLEEDING IN PREGNANCY, THIRD TRIMESTER: Primary | ICD-10-CM

## 2021-12-06 DIAGNOSIS — R10.2 PELVIC PRESSURE IN PREGNANCY, THIRD TRIMESTER: ICD-10-CM

## 2021-12-06 DIAGNOSIS — O24.410 DIET CONTROLLED GESTATIONAL DIABETES MELLITUS (GDM) IN THIRD TRIMESTER: ICD-10-CM

## 2021-12-06 DIAGNOSIS — O26.893 PELVIC PRESSURE IN PREGNANCY, THIRD TRIMESTER: ICD-10-CM

## 2021-12-06 PROCEDURE — 3008F BODY MASS INDEX DOCD: CPT | Performed by: OBSTETRICS & GYNECOLOGY

## 2021-12-06 PROCEDURE — 87086 URINE CULTURE/COLONY COUNT: CPT | Performed by: OBSTETRICS & GYNECOLOGY

## 2021-12-06 PROCEDURE — 76818 FETAL BIOPHYS PROFILE W/NST: CPT | Performed by: OBSTETRICS & GYNECOLOGY

## 2021-12-06 PROCEDURE — 3074F SYST BP LT 130 MM HG: CPT | Performed by: OBSTETRICS & GYNECOLOGY

## 2021-12-06 PROCEDURE — 87081 CULTURE SCREEN ONLY: CPT | Performed by: OBSTETRICS & GYNECOLOGY

## 2021-12-06 PROCEDURE — 76819 FETAL BIOPHYS PROFIL W/O NST: CPT | Performed by: OBSTETRICS & GYNECOLOGY

## 2021-12-06 PROCEDURE — 87660 TRICHOMONAS VAGIN DIR PROBE: CPT | Performed by: OBSTETRICS & GYNECOLOGY

## 2021-12-06 PROCEDURE — 4A0HXCZ MEASUREMENT OF PRODUCTS OF CONCEPTION, CARDIAC RATE, EXTERNAL APPROACH: ICD-10-PCS | Performed by: OBSTETRICS & GYNECOLOGY

## 2021-12-06 PROCEDURE — 81002 URINALYSIS NONAUTO W/O SCOPE: CPT | Performed by: OBSTETRICS & GYNECOLOGY

## 2021-12-06 PROCEDURE — 87480 CANDIDA DNA DIR PROBE: CPT | Performed by: OBSTETRICS & GYNECOLOGY

## 2021-12-06 PROCEDURE — 81001 URINALYSIS AUTO W/SCOPE: CPT | Performed by: OBSTETRICS & GYNECOLOGY

## 2021-12-06 PROCEDURE — 87510 GARDNER VAG DNA DIR PROBE: CPT | Performed by: OBSTETRICS & GYNECOLOGY

## 2021-12-06 PROCEDURE — 59025 FETAL NON-STRESS TEST: CPT | Performed by: OBSTETRICS & GYNECOLOGY

## 2021-12-06 PROCEDURE — 76820 UMBILICAL ARTERY ECHO: CPT

## 2021-12-06 PROCEDURE — 87653 STREP B DNA AMP PROBE: CPT | Performed by: OBSTETRICS & GYNECOLOGY

## 2021-12-06 PROCEDURE — 3078F DIAST BP <80 MM HG: CPT | Performed by: OBSTETRICS & GYNECOLOGY

## 2021-12-06 PROCEDURE — 3E023GC INTRODUCTION OF OTHER THERAPEUTIC SUBSTANCE INTO MUSCLE, PERCUTANEOUS APPROACH: ICD-10-PCS | Performed by: OBSTETRICS & GYNECOLOGY

## 2021-12-06 PROCEDURE — 76815 OB US LIMITED FETUS(S): CPT

## 2021-12-06 RX ORDER — BETAMETHASONE SODIUM PHOSPHATE AND BETAMETHASONE ACETATE 3; 3 MG/ML; MG/ML
12 INJECTION, SUSPENSION INTRA-ARTICULAR; INTRALESIONAL; INTRAMUSCULAR; SOFT TISSUE EVERY 24 HOURS
Status: DISCONTINUED | OUTPATIENT
Start: 2021-12-06 | End: 2021-12-06

## 2021-12-06 NOTE — PROGRESS NOTES
Problem visit in pregnancy - same day add on    Patient called office this morning c/o pelvic pressure & vaginal spotting. Around 0330 after she wiped in bathroom - noticed bright red blood with wiping. Just on the paper. Not dripping into the toilet. hemorrhoids, 9/30/2021 at 25w3d   -better after milk of mag & miralax    5) Trauma during pregnancy hard fall on - 12/4   -C/o pelvic pressure, spotting at 35w0d,12/6/2021  -recent hard fall -> was on L&D on 12/4, No steroids given  -Nitrazine neg , Affirm

## 2021-12-06 NOTE — PROGRESS NOTES
Pt is a 29year old female admitted to TRG3/TRG3-A, Patient presents with:  Non-stress Test: Pt here from the office for NST and betamethasone     Pt is 35w0d intra-uterine pregnancy. Denies any leaking of fluid. History obtained, consents signed.  Melisa Barnes

## 2021-12-06 NOTE — PROGRESS NOTES
Dr Penelope Morelos calls this RN back. This Rn informs her pt , 35+0 weeks, Pt was sent over for nonreactive NST in office. BPP , off for movement and tone. Order received for MFM consult.

## 2021-12-06 NOTE — NST
Nonstress Test   Patient: Cora Contras    Gestation: 35w0d    NST:       Variability: Moderate           Accelerations:  Yes                        Baseline: 135 BPM           Uterine Irritability: No                                                    [place]

## 2021-12-06 NOTE — PATIENT INSTRUCTIONS
To L&D for steroids (2 shots 24 hours apart.)    Call MFM for diabetes consultation. MFM - Bernardino Swann   [facility]    [address], [address]   [address]  1st [address]

## 2021-12-06 NOTE — PROGRESS NOTES
Discharge instructions given, reviewed labor precautions, verbalized understanding. Ambulated to car in stable condition accompanied by .

## 2021-12-06 NOTE — IMAGING NOTE
OB ULTRASOUND REPORT   See imaging tab for complete ultrasound report or in PACS    Single IUP in cephalic presentation. Placenta is posterior. Cardiac activity is present at 132 bpm  MVP is 3.9 cm . FLORIN 99.3 cm    Umbilical Artery Doppler is 2.46.

## 2021-12-07 ENCOUNTER — HOSPITAL ENCOUNTER (OUTPATIENT)
Facility: HOSPITAL | Age: 36
Discharge: HOME | End: 2021-12-07
Attending: STUDENT IN AN ORGANIZED HEALTH CARE EDUCATION/TRAINING PROGRAM | Admitting: STUDENT IN AN ORGANIZED HEALTH CARE EDUCATION/TRAINING PROGRAM
Payer: COMMERCIAL

## 2021-12-07 PROCEDURE — 96372 THER/PROPH/DIAG INJ SC/IM: CPT

## 2021-12-07 RX ORDER — BETAMETHASONE SODIUM PHOSPHATE AND BETAMETHASONE ACETATE 3; 3 MG/ML; MG/ML
12 INJECTION, SUSPENSION INTRA-ARTICULAR; INTRALESIONAL; INTRAMUSCULAR; SOFT TISSUE ONCE
Status: COMPLETED | OUTPATIENT
Start: 2021-12-07 | End: 2021-12-07

## 2021-12-07 NOTE — PROGRESS NOTES
Pt here for 2 nd dose steroid given, pt denies contraction and cramping, pt denies leaking and bleeding PV, + fetal movement, pt going home in stable condition, pt not in active labor on discharge.

## 2021-12-15 ENCOUNTER — TELEPHONE (OUTPATIENT)
Dept: PERINATAL CARE | Facility: HOSPITAL | Age: 36
End: 2021-12-15

## 2021-12-15 NOTE — TELEPHONE ENCOUNTER
36w2d  Received BS log for date range 12/8-12/14     Low  High Out of Range   Fasting Blood Sugar 76 110 2 out of 7   Post Breakfast 102 132 3 out of 6   Post Lunch 114 143 4 out of 5   Post Dinner 126 146 5 out of 5     Patient is currently diet-controlle

## 2021-12-18 ENCOUNTER — ROUTINE PRENATAL (OUTPATIENT)
Dept: OBGYN CLINIC | Facility: CLINIC | Age: 36
End: 2021-12-18
Payer: COMMERCIAL

## 2021-12-18 VITALS
WEIGHT: 165.81 LBS | HEIGHT: 67 IN | BODY MASS INDEX: 26.02 KG/M2 | DIASTOLIC BLOOD PRESSURE: 74 MMHG | SYSTOLIC BLOOD PRESSURE: 112 MMHG | HEART RATE: 99 BPM

## 2021-12-18 DIAGNOSIS — Z34.93 ENCOUNTER FOR SUPERVISION OF NORMAL PREGNANCY IN THIRD TRIMESTER, UNSPECIFIED GRAVIDITY: Primary | ICD-10-CM

## 2021-12-18 DIAGNOSIS — O24.410 DIET CONTROLLED GESTATIONAL DIABETES MELLITUS (GDM) IN THIRD TRIMESTER: ICD-10-CM

## 2021-12-18 DIAGNOSIS — O09.523 AMA (ADVANCED MATERNAL AGE) MULTIGRAVIDA 35+, THIRD TRIMESTER: ICD-10-CM

## 2021-12-18 PROCEDURE — 81002 URINALYSIS NONAUTO W/O SCOPE: CPT | Performed by: OBSTETRICS & GYNECOLOGY

## 2021-12-18 PROCEDURE — 3074F SYST BP LT 130 MM HG: CPT | Performed by: OBSTETRICS & GYNECOLOGY

## 2021-12-18 PROCEDURE — 3008F BODY MASS INDEX DOCD: CPT | Performed by: OBSTETRICS & GYNECOLOGY

## 2021-12-18 PROCEDURE — 3078F DIAST BP <80 MM HG: CPT | Performed by: OBSTETRICS & GYNECOLOGY

## 2021-12-21 ENCOUNTER — TELEPHONE (OUTPATIENT)
Dept: OBGYN CLINIC | Facility: CLINIC | Age: 36
End: 2021-12-21

## 2021-12-21 NOTE — TELEPHONE ENCOUNTER
Patricia Victoria from [place] [address] called, ext [phone].  To let us know  Since the patient is on insulin it is recommended the patient have NST twice a wk

## 2021-12-22 ENCOUNTER — ROUTINE PRENATAL (OUTPATIENT)
Dept: OBGYN CLINIC | Facility: CLINIC | Age: 36
End: 2021-12-22
Payer: COMMERCIAL

## 2021-12-22 VITALS
HEIGHT: 67 IN | SYSTOLIC BLOOD PRESSURE: 114 MMHG | WEIGHT: 169.38 LBS | DIASTOLIC BLOOD PRESSURE: 74 MMHG | BODY MASS INDEX: 26.58 KG/M2 | HEART RATE: 88 BPM

## 2021-12-22 DIAGNOSIS — Z3A.37 37 WEEKS GESTATION OF PREGNANCY: ICD-10-CM

## 2021-12-22 DIAGNOSIS — O09.523 MULTIGRAVIDA OF ADVANCED MATERNAL AGE IN THIRD TRIMESTER: ICD-10-CM

## 2021-12-22 DIAGNOSIS — Z34.93 ENCOUNTER FOR SUPERVISION OF NORMAL PREGNANCY IN THIRD TRIMESTER, UNSPECIFIED GRAVIDITY: Primary | ICD-10-CM

## 2021-12-22 PROCEDURE — 81002 URINALYSIS NONAUTO W/O SCOPE: CPT | Performed by: OBSTETRICS & GYNECOLOGY

## 2021-12-22 PROCEDURE — 3078F DIAST BP <80 MM HG: CPT | Performed by: OBSTETRICS & GYNECOLOGY

## 2021-12-22 PROCEDURE — 3074F SYST BP LT 130 MM HG: CPT | Performed by: OBSTETRICS & GYNECOLOGY

## 2021-12-22 PROCEDURE — 3008F BODY MASS INDEX DOCD: CPT | Performed by: OBSTETRICS & GYNECOLOGY

## 2021-12-22 PROCEDURE — 59025 FETAL NON-STRESS TEST: CPT | Performed by: OBSTETRICS & GYNECOLOGY

## 2021-12-22 NOTE — PROGRESS NOTES
Patient has no complaints  IOL 2/27/21 cytotec 7:30 pm  -cfDNA neg, BOY. AFP declined.    -COVID, Flu, Tdap done  -3rd trim HIV neg   -breast pump ordered     1) AMA   -L2 US normal   -EFW 57% at 32 wk with MFM  -NSTs at [address] [facility]    2) GDM A2  - started on Ins

## 2021-12-24 ENCOUNTER — ROUTINE PRENATAL (OUTPATIENT)
Dept: OBGYN CLINIC | Facility: CLINIC | Age: 36
End: 2021-12-24
Payer: COMMERCIAL

## 2021-12-24 ENCOUNTER — LAB ENCOUNTER (OUTPATIENT)
Dept: LAB | Facility: HOSPITAL | Age: 36
End: 2021-12-24
Attending: OBSTETRICS & GYNECOLOGY
Payer: COMMERCIAL

## 2021-12-24 VITALS
HEART RATE: 78 BPM | WEIGHT: 168 LBS | DIASTOLIC BLOOD PRESSURE: 82 MMHG | HEIGHT: 67 IN | BODY MASS INDEX: 26.37 KG/M2 | SYSTOLIC BLOOD PRESSURE: 120 MMHG

## 2021-12-24 DIAGNOSIS — Z3A.37 37 WEEKS GESTATION OF PREGNANCY: ICD-10-CM

## 2021-12-24 DIAGNOSIS — Z34.83 PRENATAL CARE, SUBSEQUENT PREGNANCY, THIRD TRIMESTER: Primary | ICD-10-CM

## 2021-12-24 DIAGNOSIS — O24.414 INSULIN CONTROLLED GESTATIONAL DIABETES MELLITUS (GDM) IN THIRD TRIMESTER: ICD-10-CM

## 2021-12-24 DIAGNOSIS — O09.523 MULTIGRAVIDA OF ADVANCED MATERNAL AGE IN THIRD TRIMESTER: ICD-10-CM

## 2021-12-24 DIAGNOSIS — Z34.93 ENCOUNTER FOR SUPERVISION OF NORMAL PREGNANCY IN THIRD TRIMESTER, UNSPECIFIED GRAVIDITY: ICD-10-CM

## 2021-12-24 PROCEDURE — 3008F BODY MASS INDEX DOCD: CPT | Performed by: STUDENT IN AN ORGANIZED HEALTH CARE EDUCATION/TRAINING PROGRAM

## 2021-12-24 PROCEDURE — 59025 FETAL NON-STRESS TEST: CPT | Performed by: STUDENT IN AN ORGANIZED HEALTH CARE EDUCATION/TRAINING PROGRAM

## 2021-12-24 PROCEDURE — 81002 URINALYSIS NONAUTO W/O SCOPE: CPT | Performed by: STUDENT IN AN ORGANIZED HEALTH CARE EDUCATION/TRAINING PROGRAM

## 2021-12-24 PROCEDURE — 3074F SYST BP LT 130 MM HG: CPT | Performed by: STUDENT IN AN ORGANIZED HEALTH CARE EDUCATION/TRAINING PROGRAM

## 2021-12-24 PROCEDURE — 3079F DIAST BP 80-89 MM HG: CPT | Performed by: STUDENT IN AN ORGANIZED HEALTH CARE EDUCATION/TRAINING PROGRAM

## 2021-12-27 ENCOUNTER — HOSPITAL ENCOUNTER (INPATIENT)
Facility: HOSPITAL | Age: 36
LOS: 2 days | Discharge: HOME | End: 2021-12-29
Attending: OBSTETRICS & GYNECOLOGY | Admitting: OBSTETRICS & GYNECOLOGY
Payer: COMMERCIAL

## 2021-12-27 ENCOUNTER — APPOINTMENT (OUTPATIENT)
Dept: OBGYN CLINIC | Facility: HOSPITAL | Age: 36
End: 2021-12-27
Payer: COMMERCIAL

## 2021-12-27 PROCEDURE — 3E0P7GC INTRODUCTION OF OTHER THERAPEUTIC SUBSTANCE INTO FEMALE REPRODUCTIVE, VIA NATURAL OR ARTIFICIAL OPENING: ICD-10-PCS | Performed by: OBSTETRICS & GYNECOLOGY

## 2021-12-27 RX ORDER — ONDANSETRON 2 MG/ML
4 INJECTION INTRAMUSCULAR; INTRAVENOUS EVERY 6 HOURS PRN
Status: DISCONTINUED | OUTPATIENT
Start: 2021-12-27 | End: 2021-12-28 | Stop reason: HOSPADM

## 2021-12-27 RX ORDER — DEXTROSE, SODIUM CHLORIDE, SODIUM LACTATE, POTASSIUM CHLORIDE, AND CALCIUM CHLORIDE 5; .6; .31; .03; .02 G/100ML; G/100ML; G/100ML; G/100ML; G/100ML
INJECTION, SOLUTION INTRAVENOUS AS NEEDED
Status: DISCONTINUED | OUTPATIENT
Start: 2021-12-27 | End: 2021-12-28 | Stop reason: HOSPADM

## 2021-12-27 RX ORDER — AMMONIA INHALANTS 0.04 G/.3ML
0.3 INHALANT RESPIRATORY (INHALATION) AS NEEDED
Status: DISCONTINUED | OUTPATIENT
Start: 2021-12-27 | End: 2021-12-28 | Stop reason: HOSPADM

## 2021-12-27 RX ORDER — TERBUTALINE SULFATE 1 MG/ML
0.25 INJECTION, SOLUTION SUBCUTANEOUS AS NEEDED
Status: DISCONTINUED | OUTPATIENT
Start: 2021-12-27 | End: 2021-12-28 | Stop reason: HOSPADM

## 2021-12-27 RX ORDER — ACETAMINOPHEN 500 MG
500 TABLET ORAL EVERY 6 HOURS PRN
Status: DISCONTINUED | OUTPATIENT
Start: 2021-12-27 | End: 2021-12-28 | Stop reason: DRUGHIGH

## 2021-12-27 RX ORDER — NALBUPHINE HCL 10 MG/ML
2.5 AMPUL (ML) INJECTION
Status: DISCONTINUED | OUTPATIENT
Start: 2021-12-27 | End: 2021-12-28

## 2021-12-27 RX ORDER — SODIUM CHLORIDE, SODIUM LACTATE, POTASSIUM CHLORIDE, CALCIUM CHLORIDE 600; 310; 30; 20 MG/100ML; MG/100ML; MG/100ML; MG/100ML
INJECTION, SOLUTION INTRAVENOUS CONTINUOUS
Status: DISCONTINUED | OUTPATIENT
Start: 2021-12-27 | End: 2021-12-28 | Stop reason: HOSPADM

## 2021-12-27 RX ORDER — IBUPROFEN 600 MG/1
600 TABLET ORAL EVERY 6 HOURS PRN
Status: DISCONTINUED | OUTPATIENT
Start: 2021-12-27 | End: 2021-12-28 | Stop reason: HOSPADM

## 2021-12-27 RX ORDER — ZOLPIDEM TARTRATE 5 MG/1
5 TABLET ORAL NIGHTLY PRN
Status: DISCONTINUED | OUTPATIENT
Start: 2021-12-27 | End: 2021-12-28

## 2021-12-27 RX ORDER — TRISODIUM CITRATE DIHYDRATE AND CITRIC ACID MONOHYDRATE 500; 334 MG/5ML; MG/5ML
30 SOLUTION ORAL AS NEEDED
Status: DISCONTINUED | OUTPATIENT
Start: 2021-12-27 | End: 2021-12-28 | Stop reason: HOSPADM

## 2021-12-27 RX ORDER — HYDROMORPHONE HYDROCHLORIDE 1 MG/ML
1 INJECTION, SOLUTION INTRAMUSCULAR; INTRAVENOUS; SUBCUTANEOUS EVERY 2 HOUR PRN
Status: DISCONTINUED | OUTPATIENT
Start: 2021-12-27 | End: 2021-12-28

## 2021-12-27 RX ORDER — DEXTROSE MONOHYDRATE 25 G/50ML
50 INJECTION, SOLUTION INTRAVENOUS
Status: DISCONTINUED | OUTPATIENT
Start: 2021-12-27 | End: 2021-12-28 | Stop reason: HOSPADM

## 2021-12-27 RX ORDER — BUPIVACAINE HCL/0.9 % NACL/PF 0.25 %
5 PLASTIC BAG, INJECTION (ML) EPIDURAL AS NEEDED
Status: DISCONTINUED | OUTPATIENT
Start: 2021-12-27 | End: 2021-12-28

## 2021-12-27 NOTE — PROGRESS NOTES
Doing well overall    IOL 12/27/21 cytotec 7:30 pm  -cfDNA neg, BOY. AFP declined.    -COVID, Flu, Tdap done  -3rd trim HIV neg   -breast pump ordered   -GBS neg    1) AMA   -L2 US normal   -EFW 57% at 32 wk with MFM  -NSTs at [address] [facility]    2) GDM A2  - started o

## 2021-12-28 ENCOUNTER — ANESTHESIA EVENT (OUTPATIENT)
Dept: OBGYN UNIT | Facility: HOSPITAL | Age: 36
End: 2021-12-28
Payer: COMMERCIAL

## 2021-12-28 ENCOUNTER — ANESTHESIA (OUTPATIENT)
Dept: OBGYN UNIT | Facility: HOSPITAL | Age: 36
End: 2021-12-28
Payer: COMMERCIAL

## 2021-12-28 PROBLEM — O24.414 INSULIN CONTROLLED GESTATIONAL DIABETES MELLITUS (GDM) IN THIRD TRIMESTER (HCC): Status: ACTIVE | Noted: 2021-11-05

## 2021-12-28 PROBLEM — O24.414 INSULIN CONTROLLED GESTATIONAL DIABETES MELLITUS (GDM) IN THIRD TRIMESTER: Status: ACTIVE | Noted: 2021-11-05

## 2021-12-28 PROBLEM — Z98.890 STATUS POST INDUCTION OF LABOR: Status: ACTIVE | Noted: 2021-12-28

## 2021-12-28 PROCEDURE — 0KQM0ZZ REPAIR PERINEUM MUSCLE, OPEN APPROACH: ICD-10-PCS | Performed by: OBSTETRICS & GYNECOLOGY

## 2021-12-28 PROCEDURE — 59400 OBSTETRICAL CARE: CPT | Performed by: OBSTETRICS & GYNECOLOGY

## 2021-12-28 PROCEDURE — 10907ZC DRAINAGE OF AMNIOTIC FLUID, THERAPEUTIC FROM PRODUCTS OF CONCEPTION, VIA NATURAL OR ARTIFICIAL OPENING: ICD-10-PCS | Performed by: OBSTETRICS & GYNECOLOGY

## 2021-12-28 PROCEDURE — 3E033VJ INTRODUCTION OF OTHER HORMONE INTO PERIPHERAL VEIN, PERCUTANEOUS APPROACH: ICD-10-PCS | Performed by: OBSTETRICS & GYNECOLOGY

## 2021-12-28 RX ORDER — DOCUSATE SODIUM 100 MG/1
100 CAPSULE, LIQUID FILLED ORAL
Status: DISCONTINUED | OUTPATIENT
Start: 2021-12-28 | End: 2021-12-29

## 2021-12-28 RX ORDER — SIMETHICONE 80 MG
80 TABLET,CHEWABLE ORAL 3 TIMES DAILY PRN
Status: DISCONTINUED | OUTPATIENT
Start: 2021-12-28 | End: 2021-12-29

## 2021-12-28 RX ORDER — IBUPROFEN 600 MG/1
600 TABLET ORAL EVERY 6 HOURS
Status: DISCONTINUED | OUTPATIENT
Start: 2021-12-28 | End: 2021-12-29

## 2021-12-28 RX ORDER — NALBUPHINE HCL 10 MG/ML
2.5 AMPUL (ML) INJECTION
Status: DISCONTINUED | OUTPATIENT
Start: 2021-12-28 | End: 2021-12-28

## 2021-12-28 RX ORDER — BUPIVACAINE HCL/0.9 % NACL/PF 0.25 %
5 PLASTIC BAG, INJECTION (ML) EPIDURAL AS NEEDED
Status: DISCONTINUED | OUTPATIENT
Start: 2021-12-28 | End: 2021-12-28

## 2021-12-28 RX ORDER — ENOXAPARIN SODIUM 100 MG/ML
40 INJECTION SUBCUTANEOUS DAILY
Status: DISCONTINUED | OUTPATIENT
Start: 2021-12-28 | End: 2021-12-29

## 2021-12-28 RX ORDER — BISACODYL 10 MG
10 SUPPOSITORY, RECTAL RECTAL ONCE AS NEEDED
Status: DISCONTINUED | OUTPATIENT
Start: 2021-12-28 | End: 2021-12-29

## 2021-12-28 RX ORDER — ACETAMINOPHEN 325 MG/1
650 TABLET ORAL EVERY 6 HOURS PRN
Status: DISCONTINUED | OUTPATIENT
Start: 2021-12-28 | End: 2021-12-29

## 2021-12-28 NOTE — PROGRESS NOTES
OB attending    A/P: 29year old  at 43w4d - IOL for GDM on insulin, AMA    +FWB    IOL  -s/p misoprostol x 1  -continue to titrate IV oxytocin  -AROM moderately large clear fluid at 0854    GDM insulin per MFM - poorly controlled   -admission gluco

## 2021-12-28 NOTE — H&P
Timoteo Patient Status:  Inpatient    1985 MRN [id number]   Location [address] [facility] Attending Hillary Good, [address] [facility] Day # 1 PCP Wesley Romano MD     Date of Admission:   Smoking status: Never Smoker      Smokeless tobacco: Never Used    Alcohol use: Yes      Alcohol/week: 1.0 standard drink      Types: 1 Glasses of wine per week      Comment: Rarely        Allergies/Medications:    Allergies:   No Known Allergies  Medicati 1.00 - 4.00 x10(3) uL     Monocytes Absolute      0.10 - 1.00 x10(3) uL     Eosinophils Absolute      0.00 - 0.70 x10(3) uL     Basophils Absolute      0.00 - 0.20 x10(3) uL     Immature Granulocyte Absolute      0.00 - 1.00 x10(3) uL     Neutrophils % oxytocin at 0500  -will AROM when more comfortable - just had epidural placed. GDM insulin per MFM - poorly controlled   -admission glucose 119.  Check A1c   -accuchecks  -insulin gtt if needed    A+/GBS neg  Epidural placed    H/o postpartum depression

## 2021-12-28 NOTE — PLAN OF CARE
Problem: BIRTH - VAGINAL/ SECTION  Goal: Fetal and maternal status remain reassuring during the birth process  Description: INTERVENTIONS:  - Monitor vital signs  - Monitor fetal heart rate  - Monitor uterine activity  - Monitor labor progression to maintain glucose within target range  - Assess barriers to adequate nutritional intake and initiate nutrition consult as needed  - Instruct patient on self management of diabetes  12/28/2021 1101 by Enzo Norman RN  Outcome: Completed  12/28/2021 092

## 2021-12-28 NOTE — ANESTHESIA PREPROCEDURE EVALUATION
PRE-OP EVALUATION    Patient Name: Edgardo Lopez    Admit Diagnosis: preg state  Pregnancy    Pre-op Diagnosis: * No surgery found *        Anesthesia Procedure: LABOR ANALGESIA    * Surgery not found *    Pre-op vitals reviewed.   Temp: 98.1 °F (36.7 °C)  Pul mg, Intravenous, Q15 Min PRN  zolpidem (AMBIEN) tab 5 mg, 5 mg, Oral, Nightly PRN  glucose (DEX4) oral liquid 15 g, 15 g, Oral, Q15 Min PRN   Or  glucose-vitamin C (DEX-4) chewable tab 4 tablet, 4 tablet, Oral, Q15 Min PRN   Or  dextrose 50 % injection 50 Endo/Other      (+) diabetes and well controlled, gestational,                          Pulmonary    Negative pulmonary ROS. Neuro/Psych  Comment: H/o TB  Negative neuro/psych ROS.                                 Past Surg

## 2021-12-28 NOTE — L&D DELIVERY NOTE
Michelle Gómez [[id number]]    Labor Events     labor?: No   steroids?: Full Course  Cervical ripening type: Misoprostol  Antibiotics received during labor?: No  Rupture date/time: 2021 0846     Rupture type: AROM  Fluid color: Clear  Hattie effort Absent Weak cry; hypoventilation Good, crying              1 Minute:  5 Minute:  10 Minute:  15 Minute:  20 Minute:    Skin color: 0  1       Heart rate: 2  2       Reflex irritablity: 2  2       Muscle tone: 2  2       Respiratory effort: 2  2

## 2021-12-28 NOTE — PROGRESS NOTES
Patient transferred to mother/baby room 6740 per wheelchair in stable condition with baby and personal belongings. Accompanied by significant other and staff. Report given to mother/baby RN Jonatan Clifton.  Rosy Trejo RN

## 2021-12-28 NOTE — PROGRESS NOTES
Report received from [facility]. Assumed care for shift.  [facility]

## 2021-12-28 NOTE — PLAN OF CARE
Problem: BIRTH - VAGINAL/ SECTION  Goal: Fetal and maternal status remain reassuring during the birth process  Description: INTERVENTIONS:  - Monitor vital signs  - Monitor fetal heart rate  - Monitor uterine activity  - Monitor labor progression Patient's Long Term Goal: healthy     Interventions:    - See additional Care Plan goals for specific interventions  Outcome: Progressing  Goal: Patient/Family Short Term Goal  Description: Patient's Short Term Goal: adequate pain control    Interve

## 2021-12-28 NOTE — ANESTHESIA PROCEDURE NOTES
Labor Analgesia  Performed by: Mayi Hartman MD  Authorized by: Mayi Hartman MD       General Information and Staff    Start Time:  12/28/2021 8:00 AM  End Time:  12/28/2021 8:16 AM  Anesthesiologist:  Mayi Hartman MD  Performed by:   Aravind

## 2021-12-29 VITALS
OXYGEN SATURATION: 99 % | HEIGHT: 67 IN | TEMPERATURE: 98 F | DIASTOLIC BLOOD PRESSURE: 74 MMHG | RESPIRATION RATE: 17 BRPM | SYSTOLIC BLOOD PRESSURE: 124 MMHG | BODY MASS INDEX: 26.37 KG/M2 | WEIGHT: 168 LBS | HEART RATE: 67 BPM

## 2021-12-29 NOTE — PROGRESS NOTES
PPD#1    Pt has no complaints, lochia min   12/29/21  0748   BP: 124/74   Pulse: 67   Resp: 17   Temp: 98 °F (36.7 °C)     Recent Labs   Lab 12/27/21 2003 12/29/21  1047   RBC 4.28 4.41   HGB 12.3 12.3   HCT 35.8 37.6   MCV 83.6 85.3   MCH 28.7 27.9   MCH

## 2021-12-29 NOTE — PROGRESS NOTES
Discharge instructions reviewed with patient and . Patient encouraged to ask questions and discharge paperwork provided. Teal band explained and given to patient. Patient has follow up appointment with OB/GYN for February 10.

## 2021-12-31 ENCOUNTER — TELEPHONE (OUTPATIENT)
Dept: OBGYN UNIT | Facility: HOSPITAL | Age: 36
End: 2021-12-31

## 2022-01-06 NOTE — PROGRESS NOTES
Labor Analgesia Follow Up Note    Patient underwent epidural anesthesia for labor analgesia,    Placenta Date/Time: 12/28/2021 10:58 AM    Delivery Date/Time[de-identified] 12/28/2021  10:52 AM    /74 (BP Location: Left arm)   Pulse 67   Temp 98 °F (36.7 °C) (Ora

## 2022-02-10 ENCOUNTER — POSTPARTUM (OUTPATIENT)
Dept: OBGYN CLINIC | Facility: CLINIC | Age: 37
End: 2022-02-10
Payer: COMMERCIAL

## 2022-02-10 VITALS
HEART RATE: 100 BPM | WEIGHT: 146 LBS | HEIGHT: 67 IN | BODY MASS INDEX: 22.91 KG/M2 | SYSTOLIC BLOOD PRESSURE: 120 MMHG | DIASTOLIC BLOOD PRESSURE: 62 MMHG

## 2022-02-10 DIAGNOSIS — Z13.228 SCREENING FOR ENDOCRINE, NUTRITIONAL, METABOLIC AND IMMUNITY DISORDER: ICD-10-CM

## 2022-02-10 DIAGNOSIS — Z71.85 HPV VACCINE COUNSELING: ICD-10-CM

## 2022-02-10 DIAGNOSIS — Z86.32 HISTORY OF GESTATIONAL DIABETES: ICD-10-CM

## 2022-02-10 DIAGNOSIS — Z13.21 SCREENING FOR ENDOCRINE, NUTRITIONAL, METABOLIC AND IMMUNITY DISORDER: ICD-10-CM

## 2022-02-10 DIAGNOSIS — Z30.09 GENERAL COUNSELING AND ADVICE FOR CONTRACEPTIVE MANAGEMENT: ICD-10-CM

## 2022-02-10 DIAGNOSIS — Z13.0 SCREENING FOR ENDOCRINE, NUTRITIONAL, METABOLIC AND IMMUNITY DISORDER: ICD-10-CM

## 2022-02-10 DIAGNOSIS — Z13.29 SCREENING FOR ENDOCRINE, NUTRITIONAL, METABOLIC AND IMMUNITY DISORDER: ICD-10-CM

## 2022-02-10 DIAGNOSIS — M62.89 PELVIC FLOOR DYSFUNCTION IN FEMALE: ICD-10-CM

## 2022-02-10 PROBLEM — Z86.59 HISTORY OF POSTPARTUM DEPRESSION, CURRENTLY PREGNANT IN THIRD TRIMESTER: Status: RESOLVED | Noted: 2021-09-30 | Resolved: 2022-02-10

## 2022-02-10 PROBLEM — O09.292 HISTORY OF GESTATIONAL DIABETES IN PRIOR PREGNANCY, CURRENTLY PREGNANT IN SECOND TRIMESTER (HCC): Status: RESOLVED | Noted: 2021-09-30 | Resolved: 2022-02-10

## 2022-02-10 PROBLEM — Z34.83 PRENATAL CARE, SUBSEQUENT PREGNANCY IN THIRD TRIMESTER: Status: RESOLVED | Noted: 2021-09-30 | Resolved: 2022-02-10

## 2022-02-10 PROBLEM — O99.891 HISTORY OF POSTPARTUM DEPRESSION, CURRENTLY PREGNANT IN THIRD TRIMESTER (HCC): Status: RESOLVED | Noted: 2021-09-30 | Resolved: 2022-02-10

## 2022-02-10 PROBLEM — O09.523 AMA (ADVANCED MATERNAL AGE) MULTIGRAVIDA 35+, THIRD TRIMESTER (HCC): Status: RESOLVED | Noted: 2021-05-25 | Resolved: 2022-02-10

## 2022-02-10 PROBLEM — Z86.59 HISTORY OF POSTPARTUM DEPRESSION, CURRENTLY PREGNANT IN THIRD TRIMESTER (HCC): Status: RESOLVED | Noted: 2021-09-30 | Resolved: 2022-02-10

## 2022-02-10 PROBLEM — Z34.83 PRENATAL CARE, SUBSEQUENT PREGNANCY IN THIRD TRIMESTER (HCC): Status: RESOLVED | Noted: 2021-09-30 | Resolved: 2022-02-10

## 2022-02-10 PROBLEM — O99.891 HISTORY OF POSTPARTUM DEPRESSION, CURRENTLY PREGNANT IN THIRD TRIMESTER: Status: RESOLVED | Noted: 2021-09-30 | Resolved: 2022-02-10

## 2022-02-10 PROBLEM — O24.414 INSULIN CONTROLLED GESTATIONAL DIABETES MELLITUS (GDM) IN THIRD TRIMESTER: Status: RESOLVED | Noted: 2021-11-05 | Resolved: 2022-02-10

## 2022-02-10 PROBLEM — K59.00 CONSTIPATION DURING PREGNANCY IN THIRD TRIMESTER (HCC): Status: RESOLVED | Noted: 2021-09-30 | Resolved: 2022-02-10

## 2022-02-10 PROBLEM — O09.523 AMA (ADVANCED MATERNAL AGE) MULTIGRAVIDA 35+, THIRD TRIMESTER: Status: RESOLVED | Noted: 2021-05-25 | Resolved: 2022-02-10

## 2022-02-10 PROBLEM — O99.613 CONSTIPATION DURING PREGNANCY IN THIRD TRIMESTER (HCC): Status: RESOLVED | Noted: 2021-09-30 | Resolved: 2022-02-10

## 2022-02-10 PROBLEM — O09.292 HISTORY OF GESTATIONAL DIABETES IN PRIOR PREGNANCY, CURRENTLY PREGNANT IN SECOND TRIMESTER: Status: RESOLVED | Noted: 2021-09-30 | Resolved: 2022-02-10

## 2022-02-10 PROBLEM — O24.414 INSULIN CONTROLLED GESTATIONAL DIABETES MELLITUS (GDM) IN THIRD TRIMESTER (HCC): Status: RESOLVED | Noted: 2021-11-05 | Resolved: 2022-02-10

## 2022-02-10 PROBLEM — Z98.890 STATUS POST INDUCTION OF LABOR: Status: RESOLVED | Noted: 2021-12-28 | Resolved: 2022-02-10

## 2022-02-10 PROBLEM — K59.00 CONSTIPATION DURING PREGNANCY IN THIRD TRIMESTER: Status: RESOLVED | Noted: 2021-09-30 | Resolved: 2022-02-10

## 2022-02-10 PROBLEM — O99.613 CONSTIPATION DURING PREGNANCY IN THIRD TRIMESTER: Status: RESOLVED | Noted: 2021-09-30 | Resolved: 2022-02-10

## 2022-02-10 PROCEDURE — 3008F BODY MASS INDEX DOCD: CPT | Performed by: OBSTETRICS & GYNECOLOGY

## 2022-02-10 PROCEDURE — 3074F SYST BP LT 130 MM HG: CPT | Performed by: OBSTETRICS & GYNECOLOGY

## 2022-02-10 PROCEDURE — 3078F DIAST BP <80 MM HG: CPT | Performed by: OBSTETRICS & GYNECOLOGY

## 2022-08-05 ENCOUNTER — LAB ENCOUNTER (OUTPATIENT)
Dept: LAB | Age: 37
End: 2022-08-05
Attending: OBSTETRICS & GYNECOLOGY
Payer: COMMERCIAL

## 2022-08-05 DIAGNOSIS — Z86.32 HISTORY OF GESTATIONAL DIABETES: ICD-10-CM

## 2022-08-05 DIAGNOSIS — Z13.21 SCREENING FOR ENDOCRINE, NUTRITIONAL, METABOLIC AND IMMUNITY DISORDER: ICD-10-CM

## 2022-08-05 DIAGNOSIS — Z13.0 SCREENING FOR ENDOCRINE, NUTRITIONAL, METABOLIC AND IMMUNITY DISORDER: ICD-10-CM

## 2022-08-05 DIAGNOSIS — Z13.29 SCREENING FOR ENDOCRINE, NUTRITIONAL, METABOLIC AND IMMUNITY DISORDER: ICD-10-CM

## 2022-08-05 DIAGNOSIS — Z13.228 SCREENING FOR ENDOCRINE, NUTRITIONAL, METABOLIC AND IMMUNITY DISORDER: ICD-10-CM

## 2022-08-05 LAB
ALBUMIN SERPL-MCNC: 3.8 G/DL (ref 3.4–5)
ALBUMIN/GLOB SERPL: 1 {RATIO} (ref 1–2)
ALP LIVER SERPL-CCNC: 87 U/L
ALT SERPL-CCNC: 25 U/L
ANION GAP SERPL CALC-SCNC: 6 MMOL/L (ref 0–18)
AST SERPL-CCNC: 17 U/L (ref 15–37)
BASOPHILS # BLD AUTO: 0.04 X10(3) UL (ref 0–0.2)
BASOPHILS NFR BLD AUTO: 0.4 %
BILIRUB SERPL-MCNC: 1.2 MG/DL (ref 0.1–2)
BUN BLD-MCNC: 8 MG/DL (ref 7–18)
CALCIUM BLD-MCNC: 8.9 MG/DL (ref 8.5–10.1)
CHLORIDE SERPL-SCNC: 106 MMOL/L (ref 98–112)
CHOLEST SERPL-MCNC: 131 MG/DL (ref ?–200)
CO2 SERPL-SCNC: 26 MMOL/L (ref 21–32)
CREAT BLD-MCNC: 0.68 MG/DL
EOSINOPHIL # BLD AUTO: 0.36 X10(3) UL (ref 0–0.7)
EOSINOPHIL NFR BLD AUTO: 3.7 %
ERYTHROCYTE [DISTWIDTH] IN BLOOD BY AUTOMATED COUNT: 12.4 %
EST. AVERAGE GLUCOSE BLD GHB EST-MCNC: 114 MG/DL (ref 68–126)
FASTING PATIENT LIPID ANSWER: YES
FASTING STATUS PATIENT QL REPORTED: YES
GFR SERPLBLD BASED ON 1.73 SQ M-ARVRAT: 116 ML/MIN/1.73M2 (ref 60–?)
GLOBULIN PLAS-MCNC: 3.7 G/DL (ref 2.8–4.4)
GLUCOSE 1H P GLC SERPL-MCNC: 139 MG/DL
GLUCOSE 2H P GLC SERPL-MCNC: 107 MG/DL
GLUCOSE BLD-MCNC: 93 MG/DL (ref 70–99)
GLUCOSE P FAST SERPL-MCNC: 96 MG/DL
HBA1C MFR BLD: 5.6 % (ref ?–5.7)
HCT VFR BLD AUTO: 38.8 %
HDLC SERPL-MCNC: 56 MG/DL (ref 40–59)
HGB BLD-MCNC: 12.9 G/DL
IMM GRANULOCYTES # BLD AUTO: 0.04 X10(3) UL (ref 0–1)
IMM GRANULOCYTES NFR BLD: 0.4 %
LDLC SERPL CALC-MCNC: 62 MG/DL (ref ?–100)
LYMPHOCYTES # BLD AUTO: 2.4 X10(3) UL (ref 1–4)
LYMPHOCYTES NFR BLD AUTO: 24.5 %
MCH RBC QN AUTO: 27.7 PG (ref 26–34)
MCHC RBC AUTO-ENTMCNC: 33.2 G/DL (ref 31–37)
MCV RBC AUTO: 83.4 FL
MONOCYTES # BLD AUTO: 0.55 X10(3) UL (ref 0.1–1)
MONOCYTES NFR BLD AUTO: 5.6 %
NEUTROPHILS # BLD AUTO: 6.41 X10 (3) UL (ref 1.5–7.7)
NEUTROPHILS # BLD AUTO: 6.41 X10(3) UL (ref 1.5–7.7)
NEUTROPHILS NFR BLD AUTO: 65.4 %
NONHDLC SERPL-MCNC: 75 MG/DL (ref ?–130)
OSMOLALITY SERPL CALC.SUM OF ELEC: 284 MOSM/KG (ref 275–295)
PLATELET # BLD AUTO: 302 10(3)UL (ref 150–450)
POTASSIUM SERPL-SCNC: 4.2 MMOL/L (ref 3.5–5.1)
PROT SERPL-MCNC: 7.5 G/DL (ref 6.4–8.2)
RBC # BLD AUTO: 4.65 X10(6)UL
SODIUM SERPL-SCNC: 138 MMOL/L (ref 136–145)
TRIGL SERPL-MCNC: 58 MG/DL (ref 30–149)
TSI SER-ACNC: 1.03 MIU/ML (ref 0.36–3.74)
VLDLC SERPL CALC-MCNC: 9 MG/DL (ref 0–30)
WBC # BLD AUTO: 9.8 X10(3) UL (ref 4–11)

## 2022-08-05 PROCEDURE — 82951 GLUCOSE TOLERANCE TEST (GTT): CPT | Performed by: OBSTETRICS & GYNECOLOGY

## 2022-08-05 PROCEDURE — 80061 LIPID PANEL: CPT | Performed by: OBSTETRICS & GYNECOLOGY

## 2022-08-05 PROCEDURE — 83036 HEMOGLOBIN GLYCOSYLATED A1C: CPT | Performed by: OBSTETRICS & GYNECOLOGY

## 2022-08-05 PROCEDURE — 80050 GENERAL HEALTH PANEL: CPT | Performed by: OBSTETRICS & GYNECOLOGY

## 2022-08-07 PROBLEM — R73.01 ELEVATED FASTING GLUCOSE: Status: ACTIVE | Noted: 2022-08-05

## 2022-08-08 ENCOUNTER — TELEPHONE (OUTPATIENT)
Dept: OBGYN CLINIC | Facility: CLINIC | Age: 37
End: 2022-08-08

## 2022-08-08 NOTE — PROGRESS NOTES
Discussed Lab results. Pt saw Dr. Elvis Scott recommendations. She will f/u with her PCP. Verb understanding.

## 2022-09-06 ENCOUNTER — OFFICE VISIT (OUTPATIENT)
Dept: OBGYN CLINIC | Facility: CLINIC | Age: 37
End: 2022-09-06
Payer: COMMERCIAL

## 2022-09-06 VITALS
WEIGHT: 145 LBS | BODY MASS INDEX: 22.76 KG/M2 | SYSTOLIC BLOOD PRESSURE: 118 MMHG | DIASTOLIC BLOOD PRESSURE: 70 MMHG | HEART RATE: 84 BPM | HEIGHT: 67 IN

## 2022-09-06 DIAGNOSIS — Z86.018 HISTORY OF UTERINE FIBROID: ICD-10-CM

## 2022-09-06 DIAGNOSIS — R10.2 PELVIC PAIN: Primary | ICD-10-CM

## 2022-09-06 DIAGNOSIS — N93.9 ABNORMAL UTERINE BLEEDING (AUB): ICD-10-CM

## 2022-09-06 DIAGNOSIS — N94.10 DYSPAREUNIA, FEMALE: ICD-10-CM

## 2022-09-06 DIAGNOSIS — M62.89 PELVIC FLOOR DYSFUNCTION IN FEMALE: ICD-10-CM

## 2022-09-06 LAB
BILIRUB UR QL STRIP.AUTO: NEGATIVE
BILIRUBIN: NEGATIVE
CLARITY UR REFRACT.AUTO: CLEAR
COLOR UR AUTO: YELLOW
CONTROL LINE PRESENT WITH A CLEAR BACKGROUND (YES/NO): YES YES/NO
GLUCOSE (URINE DIPSTICK): NEGATIVE MG/DL
GLUCOSE UR STRIP.AUTO-MCNC: NEGATIVE MG/DL
KETONES (URINE DIPSTICK): NEGATIVE MG/DL
KETONES UR STRIP.AUTO-MCNC: NEGATIVE MG/DL
LEUKOCYTE ESTERASE UR QL STRIP.AUTO: NEGATIVE
LEUKOCYTES: NEGATIVE
MULTISTIX LOT#: ABNORMAL NUMERIC
NITRITE UR QL STRIP.AUTO: NEGATIVE
NITRITE, URINE: NEGATIVE
PH UR STRIP.AUTO: 7 [PH] (ref 5–8)
PH, URINE: 7.5 (ref 4.5–8)
PREGNANCY TEST, URINE: NEGATIVE
PROT UR STRIP.AUTO-MCNC: NEGATIVE MG/DL
PROTEIN (URINE DIPSTICK): NEGATIVE MG/DL
RBC UR QL AUTO: NEGATIVE
SP GR UR STRIP.AUTO: 1.02 (ref 1–1.03)
SPECIFIC GRAVITY: 1.02 (ref 1–1.03)
UROBILINOGEN UR STRIP.AUTO-MCNC: 0.2 MG/DL
UROBILINOGEN,SEMI-QN: 0.2 MG/DL (ref 0–1.9)

## 2022-09-06 PROCEDURE — 87086 URINE CULTURE/COLONY COUNT: CPT | Performed by: OBSTETRICS & GYNECOLOGY

## 2022-09-06 PROCEDURE — 81002 URINALYSIS NONAUTO W/O SCOPE: CPT | Performed by: OBSTETRICS & GYNECOLOGY

## 2022-09-06 PROCEDURE — 3008F BODY MASS INDEX DOCD: CPT | Performed by: OBSTETRICS & GYNECOLOGY

## 2022-09-06 PROCEDURE — 87660 TRICHOMONAS VAGIN DIR PROBE: CPT | Performed by: OBSTETRICS & GYNECOLOGY

## 2022-09-06 PROCEDURE — 99214 OFFICE O/P EST MOD 30 MIN: CPT | Performed by: OBSTETRICS & GYNECOLOGY

## 2022-09-06 PROCEDURE — 3078F DIAST BP <80 MM HG: CPT | Performed by: OBSTETRICS & GYNECOLOGY

## 2022-09-06 PROCEDURE — 3074F SYST BP LT 130 MM HG: CPT | Performed by: OBSTETRICS & GYNECOLOGY

## 2022-09-06 PROCEDURE — 81025 URINE PREGNANCY TEST: CPT | Performed by: OBSTETRICS & GYNECOLOGY

## 2022-09-06 PROCEDURE — 81003 URINALYSIS AUTO W/O SCOPE: CPT | Performed by: OBSTETRICS & GYNECOLOGY

## 2022-09-06 PROCEDURE — 87480 CANDIDA DNA DIR PROBE: CPT | Performed by: OBSTETRICS & GYNECOLOGY

## 2022-09-06 PROCEDURE — 87510 GARDNER VAG DNA DIR PROBE: CPT | Performed by: OBSTETRICS & GYNECOLOGY

## 2022-09-06 RX ORDER — MEDROXYPROGESTERONE ACETATE 10 MG/1
TABLET ORAL
Qty: 30 TABLET | Refills: 5 | Status: SHIPPED | OUTPATIENT
Start: 2022-09-06

## 2022-09-20 ENCOUNTER — TELEMEDICINE (OUTPATIENT)
Dept: FAMILY MEDICINE CLINIC | Facility: CLINIC | Age: 37
End: 2022-09-20

## 2022-09-20 DIAGNOSIS — R05.1 ACUTE COUGH: Primary | ICD-10-CM

## 2022-09-20 PROCEDURE — 99203 OFFICE O/P NEW LOW 30 MIN: CPT | Performed by: FAMILY MEDICINE

## 2022-09-20 RX ORDER — NEOMYCIN SULFATE, POLYMYXIN B SULFATE, AND DEXAMETHASONE 3.5; 10000; 1 MG/G; [USP'U]/G; MG/G
OINTMENT OPHTHALMIC
COMMUNITY
Start: 2022-08-10

## 2022-09-20 RX ORDER — AZITHROMYCIN 250 MG/1
TABLET, FILM COATED ORAL
Qty: 6 TABLET | Refills: 0 | Status: SHIPPED | OUTPATIENT
Start: 2022-09-20 | End: 2022-09-25

## 2022-09-20 NOTE — PROGRESS NOTES
Video Visit    This visit is conducted using Telemedicine with live, interactive video and audio. Bridgett Stahl  verbally consents to a Video visit. Patient understands and accepts financial responsibility for any deductible, co-insurance and/or co-pays associated with this service. Duration of the service: 9:20 minutes      Summary of topics discussed:   Tonia Day is a 41 yo previously healthy F p/w:    Cough:  Pt has had a 2 week history of cough. Cough is dry, but is all day long, no pattern noted with rest or with activity. She has no SOB, no chest tightness, she endorses some wheezing initially when the cough started. She has not had any F/C, no sore throat, but has some nasal congestion. She is not having itchy eyes or ears, no sneezing. She takes OTC Robitussin with little relief, is drinking warm liquids and honey. Her aunt is also sick at home, but now better. She I vaccinated against Covid, had Covid infection in 2020. She had a negative at home Covid test 5 days ago. She is breastfeeding her infant son. ROS: as states per HPI  Physical Exam: Exam is limited due to no face to face visit today due to the Covid-19 pandemic. Pt is awake and alert, does not appear to be in acute distress. She has a cough, but no wheezing, no conversational dyspnea. She does not appear lethargic. Assessment/Plan:  1) Cough  - since has been sick x 2 weeks without any resolution of sx, ok to start Azithromycin (safe in breastfeeding)  - supportive care discussed  - ok to cont OTC cough/cold medication  - if sx persist after tx, then please f-u      Return if symptoms worsen or fail to improve.       Ade Dumont MD

## 2022-11-14 ENCOUNTER — TELEPHONE (OUTPATIENT)
Dept: FAMILY MEDICINE CLINIC | Facility: CLINIC | Age: 37
End: 2022-11-14

## 2022-11-14 NOTE — TELEPHONE ENCOUNTER
Spoke to pt, states she has left sided abdominal pain more persistant in the last 3 weeks but started in September   Had  US and urine labs by OB normal in September    States pain is always there rates  2-4/10 and describes as sharp pain. States her pain is not strong enough for her to take any medication to treat pain at this time. No issues with BM, no N/V/C, no fever. Able to eat, walk, bend and lift without problem.      Pt scheduled for next available apt, added to wait list for sooner apt

## 2022-12-05 ENCOUNTER — OFFICE VISIT (OUTPATIENT)
Dept: FAMILY MEDICINE CLINIC | Facility: CLINIC | Age: 37
End: 2022-12-05
Payer: COMMERCIAL

## 2022-12-05 VITALS
HEART RATE: 84 BPM | OXYGEN SATURATION: 99 % | WEIGHT: 145.19 LBS | DIASTOLIC BLOOD PRESSURE: 64 MMHG | RESPIRATION RATE: 16 BRPM | SYSTOLIC BLOOD PRESSURE: 118 MMHG | HEIGHT: 67 IN | BODY MASS INDEX: 22.79 KG/M2 | TEMPERATURE: 98 F

## 2022-12-05 DIAGNOSIS — R10.9 FLANK PAIN: ICD-10-CM

## 2022-12-05 DIAGNOSIS — R10.9 ABDOMINAL PAIN, UNSPECIFIED ABDOMINAL LOCATION: Primary | ICD-10-CM

## 2022-12-05 PROCEDURE — 3008F BODY MASS INDEX DOCD: CPT | Performed by: FAMILY MEDICINE

## 2022-12-05 PROCEDURE — 3078F DIAST BP <80 MM HG: CPT | Performed by: FAMILY MEDICINE

## 2022-12-05 PROCEDURE — 3074F SYST BP LT 130 MM HG: CPT | Performed by: FAMILY MEDICINE

## 2022-12-11 PROBLEM — R10.9 FLANK PAIN: Status: ACTIVE | Noted: 2022-12-11

## 2022-12-11 PROBLEM — R10.9 ABDOMINAL PAIN: Status: ACTIVE | Noted: 2022-12-11

## 2023-04-03 ENCOUNTER — TELEPHONE (OUTPATIENT)
Dept: FAMILY MEDICINE CLINIC | Facility: CLINIC | Age: 38
End: 2023-04-03

## 2023-04-03 ENCOUNTER — TELEMEDICINE (OUTPATIENT)
Dept: TELEHEALTH | Age: 38
End: 2023-04-03
Payer: COMMERCIAL

## 2023-04-03 ENCOUNTER — OFFICE VISIT (OUTPATIENT)
Dept: FAMILY MEDICINE CLINIC | Facility: CLINIC | Age: 38
End: 2023-04-03
Payer: COMMERCIAL

## 2023-04-03 VITALS
DIASTOLIC BLOOD PRESSURE: 82 MMHG | HEART RATE: 97 BPM | SYSTOLIC BLOOD PRESSURE: 120 MMHG | RESPIRATION RATE: 16 BRPM | TEMPERATURE: 99 F | HEIGHT: 67 IN | OXYGEN SATURATION: 98 % | BODY MASS INDEX: 21.97 KG/M2 | WEIGHT: 140 LBS

## 2023-04-03 DIAGNOSIS — J02.9 SORE THROAT: Primary | ICD-10-CM

## 2023-04-03 DIAGNOSIS — J38.7 ULCER OF THE THROAT: ICD-10-CM

## 2023-04-03 LAB
CONTROL LINE PRESENT WITH A CLEAR BACKGROUND (YES/NO): YES YES/NO
KIT LOT #: NORMAL NUMERIC
STREP GRP A CUL-SCR: NEGATIVE

## 2023-04-03 RX ORDER — ONDANSETRON 4 MG/1
4 TABLET, FILM COATED ORAL EVERY 8 HOURS PRN
Qty: 12 TABLET | Refills: 0 | Status: SHIPPED | OUTPATIENT
Start: 2023-04-03

## 2023-04-03 RX ORDER — LIDOCAINE HYDROCHLORIDE 20 MG/ML
5 SOLUTION OROPHARYNGEAL
Qty: 100 ML | Refills: 0 | Status: SHIPPED | OUTPATIENT
Start: 2023-04-03

## 2023-04-03 RX ORDER — CEPHALEXIN 500 MG/1
500 CAPSULE ORAL 2 TIMES DAILY
Qty: 14 CAPSULE | Refills: 0 | Status: SHIPPED | OUTPATIENT
Start: 2023-04-03 | End: 2023-04-10

## 2023-04-04 NOTE — PATIENT INSTRUCTIONS
Take ondansetron for nausea- 1 tab every 8 hours. Use viscous lidocaine to gargle for pain-- 5ml with meals and at bedtime. Take keflex twice daily for 7 days. Take with food. Use ibuprofen for pain as needed. If no better in 2-3 days follow-up with PCP for further evaluation.

## 2023-04-26 ENCOUNTER — OFFICE VISIT (OUTPATIENT)
Dept: FAMILY MEDICINE CLINIC | Facility: CLINIC | Age: 38
End: 2023-04-26
Payer: COMMERCIAL

## 2023-04-26 VITALS
HEART RATE: 75 BPM | DIASTOLIC BLOOD PRESSURE: 82 MMHG | WEIGHT: 141.63 LBS | OXYGEN SATURATION: 99 % | RESPIRATION RATE: 18 BRPM | HEIGHT: 65.91 IN | BODY MASS INDEX: 23.03 KG/M2 | TEMPERATURE: 98 F | SYSTOLIC BLOOD PRESSURE: 120 MMHG

## 2023-04-26 DIAGNOSIS — Z13.21 SCREENING FOR ENDOCRINE, NUTRITIONAL, METABOLIC AND IMMUNITY DISORDER: ICD-10-CM

## 2023-04-26 DIAGNOSIS — Z13.29 SCREENING FOR ENDOCRINE, NUTRITIONAL, METABOLIC AND IMMUNITY DISORDER: ICD-10-CM

## 2023-04-26 DIAGNOSIS — Z13.6 SCREENING FOR ISCHEMIC HEART DISEASE: ICD-10-CM

## 2023-04-26 DIAGNOSIS — Z13.228 SCREENING FOR ENDOCRINE, NUTRITIONAL, METABOLIC AND IMMUNITY DISORDER: ICD-10-CM

## 2023-04-26 DIAGNOSIS — Z00.00 ANNUAL PHYSICAL EXAM: Primary | ICD-10-CM

## 2023-04-26 DIAGNOSIS — Z13.0 SCREENING FOR ENDOCRINE, NUTRITIONAL, METABOLIC AND IMMUNITY DISORDER: ICD-10-CM

## 2023-04-26 DIAGNOSIS — Z86.32 HISTORY OF GESTATIONAL DIABETES: ICD-10-CM

## 2023-04-26 PROBLEM — R10.9 FLANK PAIN: Status: RESOLVED | Noted: 2022-12-11 | Resolved: 2023-04-26

## 2023-04-26 PROBLEM — D25.9 UTERINE FIBROID IN PREGNANCY: Status: RESOLVED | Noted: 2019-04-10 | Resolved: 2023-04-26

## 2023-04-26 PROBLEM — O9A.213 TRAUMATIC INJURY DURING PREGNANCY IN THIRD TRIMESTER: Status: RESOLVED | Noted: 2021-12-06 | Resolved: 2023-04-26

## 2023-04-26 PROBLEM — R10.2 PELVIC PAIN: Status: RESOLVED | Noted: 2022-09-06 | Resolved: 2023-04-26

## 2023-04-26 PROBLEM — R73.01 ELEVATED FASTING GLUCOSE: Status: RESOLVED | Noted: 2022-08-05 | Resolved: 2023-04-26

## 2023-04-26 PROBLEM — N94.10 DYSPAREUNIA, FEMALE: Status: RESOLVED | Noted: 2022-09-06 | Resolved: 2023-04-26

## 2023-04-26 PROBLEM — D25.9 UTERINE FIBROID IN PREGNANCY (HCC): Status: RESOLVED | Noted: 2019-04-10 | Resolved: 2023-04-26

## 2023-04-26 PROBLEM — O34.10 UTERINE FIBROID IN PREGNANCY: Status: RESOLVED | Noted: 2019-04-10 | Resolved: 2023-04-26

## 2023-04-26 PROBLEM — O9A.213 TRAUMATIC INJURY DURING PREGNANCY IN THIRD TRIMESTER (HCC): Status: RESOLVED | Noted: 2021-12-06 | Resolved: 2023-04-26

## 2023-04-26 PROBLEM — O34.10 UTERINE FIBROID IN PREGNANCY (HCC): Status: RESOLVED | Noted: 2019-04-10 | Resolved: 2023-04-26

## 2023-04-26 PROBLEM — Z34.90 PREGNANCY: Status: RESOLVED | Noted: 2021-11-15 | Resolved: 2023-04-26

## 2023-04-26 PROBLEM — O99.810 ABNORMAL MATERNAL GLUCOSE TOLERANCE, ANTEPARTUM (HCC): Status: RESOLVED | Noted: 2021-10-22 | Resolved: 2023-04-26

## 2023-04-26 PROBLEM — O99.810 ABNORMAL MATERNAL GLUCOSE TOLERANCE, ANTEPARTUM: Status: RESOLVED | Noted: 2021-10-22 | Resolved: 2023-04-26

## 2023-04-26 PROBLEM — Z34.90 PREGNANCY (HCC): Status: RESOLVED | Noted: 2021-11-15 | Resolved: 2023-04-26

## 2023-04-26 PROBLEM — K64.9 HEMORRHOIDS: Status: RESOLVED | Noted: 2021-09-30 | Resolved: 2023-04-26

## 2023-04-26 LAB
ALBUMIN SERPL-MCNC: 3.9 G/DL (ref 3.4–5)
ALBUMIN/GLOB SERPL: 1.1 {RATIO} (ref 1–2)
ALP LIVER SERPL-CCNC: 66 U/L
ALT SERPL-CCNC: 21 U/L
ANION GAP SERPL CALC-SCNC: 6 MMOL/L (ref 0–18)
AST SERPL-CCNC: 18 U/L (ref 15–37)
BASOPHILS # BLD AUTO: 0.02 X10(3) UL (ref 0–0.2)
BASOPHILS NFR BLD AUTO: 0.3 %
BILIRUB SERPL-MCNC: 0.9 MG/DL (ref 0.1–2)
BUN BLD-MCNC: 11 MG/DL (ref 7–18)
CALCIUM BLD-MCNC: 8.9 MG/DL (ref 8.5–10.1)
CHLORIDE SERPL-SCNC: 106 MMOL/L (ref 98–112)
CHOLEST SERPL-MCNC: 130 MG/DL (ref ?–200)
CO2 SERPL-SCNC: 27 MMOL/L (ref 21–32)
CREAT BLD-MCNC: 0.6 MG/DL
EOSINOPHIL # BLD AUTO: 0.31 X10(3) UL (ref 0–0.7)
EOSINOPHIL NFR BLD AUTO: 4.3 %
ERYTHROCYTE [DISTWIDTH] IN BLOOD BY AUTOMATED COUNT: 13 %
EST. AVERAGE GLUCOSE BLD GHB EST-MCNC: 111 MG/DL (ref 68–126)
FASTING PATIENT LIPID ANSWER: YES
FASTING STATUS PATIENT QL REPORTED: YES
GFR SERPLBLD BASED ON 1.73 SQ M-ARVRAT: 119 ML/MIN/1.73M2 (ref 60–?)
GLOBULIN PLAS-MCNC: 3.6 G/DL (ref 2.8–4.4)
GLUCOSE BLD-MCNC: 83 MG/DL (ref 70–99)
HBA1C MFR BLD: 5.5 % (ref ?–5.7)
HCT VFR BLD AUTO: 36.1 %
HDLC SERPL-MCNC: 51 MG/DL (ref 40–59)
HGB BLD-MCNC: 12.1 G/DL
IMM GRANULOCYTES # BLD AUTO: 0.02 X10(3) UL (ref 0–1)
IMM GRANULOCYTES NFR BLD: 0.3 %
LDLC SERPL CALC-MCNC: 68 MG/DL (ref ?–100)
LYMPHOCYTES # BLD AUTO: 2.19 X10(3) UL (ref 1–4)
LYMPHOCYTES NFR BLD AUTO: 30.1 %
MCH RBC QN AUTO: 27.7 PG (ref 26–34)
MCHC RBC AUTO-ENTMCNC: 33.5 G/DL (ref 31–37)
MCV RBC AUTO: 82.6 FL
MONOCYTES # BLD AUTO: 0.46 X10(3) UL (ref 0.1–1)
MONOCYTES NFR BLD AUTO: 6.3 %
NEUTROPHILS # BLD AUTO: 4.28 X10 (3) UL (ref 1.5–7.7)
NEUTROPHILS # BLD AUTO: 4.28 X10(3) UL (ref 1.5–7.7)
NEUTROPHILS NFR BLD AUTO: 58.7 %
NONHDLC SERPL-MCNC: 79 MG/DL (ref ?–130)
OSMOLALITY SERPL CALC.SUM OF ELEC: 287 MOSM/KG (ref 275–295)
PLATELET # BLD AUTO: 320 10(3)UL (ref 150–450)
POTASSIUM SERPL-SCNC: 4.6 MMOL/L (ref 3.5–5.1)
PROT SERPL-MCNC: 7.5 G/DL (ref 6.4–8.2)
RBC # BLD AUTO: 4.37 X10(6)UL
SODIUM SERPL-SCNC: 139 MMOL/L (ref 136–145)
TRIGL SERPL-MCNC: 48 MG/DL (ref 30–149)
TSI SER-ACNC: 1.52 MIU/ML (ref 0.36–3.74)
VLDLC SERPL CALC-MCNC: 7 MG/DL (ref 0–30)
WBC # BLD AUTO: 7.3 X10(3) UL (ref 4–11)

## 2023-04-26 PROCEDURE — 80061 LIPID PANEL: CPT | Performed by: FAMILY MEDICINE

## 2023-04-26 PROCEDURE — 3008F BODY MASS INDEX DOCD: CPT | Performed by: FAMILY MEDICINE

## 2023-04-26 PROCEDURE — 83036 HEMOGLOBIN GLYCOSYLATED A1C: CPT | Performed by: FAMILY MEDICINE

## 2023-04-26 PROCEDURE — 3074F SYST BP LT 130 MM HG: CPT | Performed by: FAMILY MEDICINE

## 2023-04-26 PROCEDURE — 99395 PREV VISIT EST AGE 18-39: CPT | Performed by: FAMILY MEDICINE

## 2023-04-26 PROCEDURE — 80050 GENERAL HEALTH PANEL: CPT | Performed by: FAMILY MEDICINE

## 2023-04-26 PROCEDURE — 3079F DIAST BP 80-89 MM HG: CPT | Performed by: FAMILY MEDICINE

## 2023-07-14 ENCOUNTER — OFFICE VISIT (OUTPATIENT)
Dept: FAMILY MEDICINE CLINIC | Facility: CLINIC | Age: 38
End: 2023-07-14
Payer: COMMERCIAL

## 2023-07-14 VITALS
SYSTOLIC BLOOD PRESSURE: 98 MMHG | OXYGEN SATURATION: 98 % | RESPIRATION RATE: 18 BRPM | BODY MASS INDEX: 23.85 KG/M2 | WEIGHT: 146.63 LBS | HEIGHT: 65.9 IN | HEART RATE: 89 BPM | DIASTOLIC BLOOD PRESSURE: 80 MMHG | TEMPERATURE: 98 F

## 2023-07-14 DIAGNOSIS — J02.9 ACUTE PHARYNGITIS, UNSPECIFIED ETIOLOGY: ICD-10-CM

## 2023-07-14 DIAGNOSIS — R09.82 POST-NASAL DRIP: Primary | ICD-10-CM

## 2023-07-14 PROCEDURE — 99213 OFFICE O/P EST LOW 20 MIN: CPT | Performed by: FAMILY MEDICINE

## 2023-07-14 PROCEDURE — 3079F DIAST BP 80-89 MM HG: CPT | Performed by: FAMILY MEDICINE

## 2023-07-14 PROCEDURE — 3074F SYST BP LT 130 MM HG: CPT | Performed by: FAMILY MEDICINE

## 2023-07-14 PROCEDURE — 87880 STREP A ASSAY W/OPTIC: CPT | Performed by: FAMILY MEDICINE

## 2023-07-14 PROCEDURE — 3008F BODY MASS INDEX DOCD: CPT | Performed by: FAMILY MEDICINE

## 2023-11-21 NOTE — PROGRESS NOTES
See phone call from 9/5 for documentation General Sunscreen Counseling: I recommended a broad spectrum sunscreen with a SPF of 30 or higher.  I explained that SPF 30 sunscreens block approximately 97 percent of the sun's harmful rays.  Sunscreens should be applied at least 15 minutes prior to expected sun exposure and then every 2 hours after that as long as sun exposure continues. If swimming or exercising sunscreen should be reapplied every 45 minutes to an hour after getting wet or sweating.  One ounce, or the equivalent of a shot glass full of sunscreen, is adequate to protect the skin not covered by a bathing suit. I also recommended a lip balm with a sunscreen as well. Sun protective clothing can be used in lieu of sunscreen but must be worn the entire time you are exposed to the sun's rays. Detail Level: Generalized

## 2024-01-24 ENCOUNTER — OFFICE VISIT (OUTPATIENT)
Dept: FAMILY MEDICINE CLINIC | Facility: CLINIC | Age: 39
End: 2024-01-24
Payer: COMMERCIAL

## 2024-01-24 VITALS
BODY MASS INDEX: 25 KG/M2 | HEART RATE: 84 BPM | RESPIRATION RATE: 18 BRPM | TEMPERATURE: 98 F | SYSTOLIC BLOOD PRESSURE: 124 MMHG | OXYGEN SATURATION: 100 % | DIASTOLIC BLOOD PRESSURE: 78 MMHG | WEIGHT: 152 LBS

## 2024-01-24 DIAGNOSIS — Z23 NEED FOR VACCINATION: ICD-10-CM

## 2024-01-24 DIAGNOSIS — K21.9 GASTROESOPHAGEAL REFLUX DISEASE WITHOUT ESOPHAGITIS: Primary | ICD-10-CM

## 2024-01-24 PROCEDURE — 99213 OFFICE O/P EST LOW 20 MIN: CPT | Performed by: FAMILY MEDICINE

## 2024-01-24 PROCEDURE — 3078F DIAST BP <80 MM HG: CPT | Performed by: FAMILY MEDICINE

## 2024-01-24 PROCEDURE — 3074F SYST BP LT 130 MM HG: CPT | Performed by: FAMILY MEDICINE

## 2024-01-24 RX ORDER — OMEPRAZOLE 40 MG/1
40 CAPSULE, DELAYED RELEASE ORAL DAILY
Qty: 30 CAPSULE | Refills: 1 | Status: SHIPPED | OUTPATIENT
Start: 2024-01-24

## 2024-01-26 PROBLEM — K21.9 GASTROESOPHAGEAL REFLUX DISEASE WITHOUT ESOPHAGITIS: Status: ACTIVE | Noted: 2024-01-26

## 2024-01-26 NOTE — PROGRESS NOTES
CHIEF COMPLAINT:   Chief Complaint   Patient presents with    Cough         HPI:     Cherie Foreman is a 38 year old female presents for cough.    Cough:  Pt reports a 3 week history of persistent cough.  Cough is dry, occurs mostly at night when laying down, about 2-3 hrs after eating. She usually eats dinner about 8p, then is laying in bed and by 1030pm or midnight, she is coughing.  The cough is coming from my stomach.\" She reports epigastric discomfort, a pressure.  She reports eating spicy food often.  She does not drink coffee or tea, she rarely drinks wine.  She is not a smoker.  She has no F/C and no allergy sx (itchy eyes, sneezing, ear pressure, itchy ears, scratchy throat). She has no heartburn. Sometimes she has a hoarse voice.              HISTORY:  Past Medical History:   Diagnosis Date    Elevated fasting glucose 2022    Gestational diabetes mellitus (GDM) controlled on oral hypoglycemic drug     Metformin     Gestational diabetes mellitus (GDM) requiring insulin 2021    Hx of fracture of foot     Left Foot    Postpartum depression     Sertraline    TB (tuberculosis), treated 2018    treated for + TB test. Pt states she took treatment meds for 9 months     TB of limb bones     Left Hip      Past Surgical History:   Procedure Laterality Date    FOOT FRACTURE SURGERY Left       2021      10/26/2019      Family History   Problem Relation Age of Onset    Other (Other) Mother         prediabetes    Other (Parkinson's) Father 60    Diabetes Paternal Grandmother     Heart Attack Paternal Grandmother     Breast Cancer Neg     Colon Cancer Neg     Ovarian Cancer Neg       Social History:   Social History     Socioeconomic History    Marital status:    Tobacco Use    Smoking status: Never    Smokeless tobacco: Never   Vaping Use    Vaping Use: Never used   Substance and Sexual Activity    Alcohol use: Not Currently     Comment: Rarely     Drug use: No    Sexual activity:  Yes     Partners: Male     Birth control/protection: Condom   Other Topics Concern    Caffeine Concern No    Exercise No    Seat Belt No    Special Diet No    Stress Concern No    Weight Concern No        Medications (Active prior to today's visit):  Current Outpatient Medications   Medication Sig Dispense Refill    Omeprazole 40 MG Oral Capsule Delayed Release Take 1 capsule (40 mg total) by mouth daily. 30 capsule 1       Allergies:  No Known Allergies    PSFH elements reviewed from today and agreed except as otherwise stated in HPI.  ROS:     Review of Systems   Constitutional:  Negative for appetite change and fatigue.   Respiratory:  Positive for cough. Negative for shortness of breath.    Gastrointestinal:  Positive for abdominal pain. Negative for blood in stool, constipation, nausea and vomiting.         Pertinent positives and negatives noted in the the HPI.    PHYSICAL EXAM:   /78 (BP Location: Left arm, Patient Position: Sitting, Cuff Size: adult)   Pulse 84   Temp 98.1 °F (36.7 °C) (Temporal)   Resp 18   Wt 152 lb (68.9 kg)   LMP 01/22/2024 (Exact Date)   SpO2 100%   Breastfeeding No   BMI 24.61 kg/m²   Vital signs reviewed.Appears stated age, well groomed.  Physical Exam  Vitals reviewed.   Constitutional:       General: She is not in acute distress.     Appearance: Normal appearance.   HENT:      Head: Normocephalic.   Cardiovascular:      Rate and Rhythm: Normal rate and regular rhythm.      Pulses: Normal pulses.      Heart sounds: Normal heart sounds.   Pulmonary:      Effort: Pulmonary effort is normal. No respiratory distress.      Breath sounds: Normal breath sounds.   Abdominal:      General: Bowel sounds are normal. There is no distension.      Palpations: Abdomen is soft.      Tenderness: There is no abdominal tenderness. There is no guarding.      Comments: No HSM   Skin:     General: Skin is warm and dry.   Neurological:      Mental Status: She is alert and oriented to person,  place, and time.   Psychiatric:         Mood and Affect: Mood normal.         Behavior: Behavior normal.          LABS     No visits with results within 2 Month(s) from this visit.   Latest known visit with results is:   Office Visit on 07/14/2023   Component Date Value    Strep Grp A Screen 07/14/2023 Negative     Control Line Present wit* 07/14/2023 Yes     Kit Lot # 07/14/2023 [id number]     Kit Expiration Date 07/14/2023 09-       REVIEWED THIS VISIT  ASSESSMENT/PLAN:   38 year old female with    1. Gastroesophageal reflux disease without esophagitis  - suspect cough is from untreated GERD  - START trial of PPI therapy daily x 1-2 months  - recommend antireflux diet and lifestyle changes  - if sx worsen or persist, may need referral to GI  - Omeprazole 40 MG Oral Capsule Delayed Release; Take 1 capsule (40 mg total) by mouth daily.  Dispense: 30 capsule; Refill: 1    2. Need for vaccination    - influenza refused    Meds This Visit:  Requested Prescriptions     Signed Prescriptions Disp Refills    Omeprazole 40 MG Oral Capsule Delayed Release 30 capsule 1     Sig: Take 1 capsule (40 mg total) by mouth daily.       Health Maintenance:  Health Maintenance   Topic Date Due    COVID-19 Vaccine (4 - 2023-24 season) 09/01/2023    Annual Physical  04/26/2024    Pap Smear  05/25/2024    Influenza Vaccine (1) 01/24/2025 (Originally 10/1/2023)    DTaP,Tdap,and Td Vaccines (4 - Td or Tdap) 11/11/2031    Annual Depression Screening  Completed    Pneumococcal Vaccine: Birth to 64yrs  Aged Out         Patient/Caregiver Education: There are no barriers to learning. Medical education done.   Outcome: Patient verbalizes understanding and agrees with plan. Advised to call or RTC if symptoms persist or worsen.    Problem List:     Patient Active Problem List   Diagnosis    History of gestational diabetes    Pelvic floor dysfunction in female    History of uterine fibroid    Abnormal uterine bleeding (AUB)       Imaging &  Referrals:  INFLUENZA REFUSED [facility]     1/26/2024  Esther Davis MD      Patient understands plan and follow-up.  Return in about 3 months (around 4/24/2024) for annual physical with pap.

## 2024-02-12 ENCOUNTER — TELEPHONE (OUTPATIENT)
Dept: FAMILY MEDICINE CLINIC | Facility: CLINIC | Age: 39
End: 2024-02-12

## 2024-02-12 DIAGNOSIS — K21.9 GASTROESOPHAGEAL REFLUX DISEASE WITHOUT ESOPHAGITIS: ICD-10-CM

## 2024-02-13 RX ORDER — OMEPRAZOLE 40 MG/1
40 CAPSULE, DELAYED RELEASE ORAL DAILY
Qty: 30 CAPSULE | Refills: 1 | Status: SHIPPED | OUTPATIENT
Start: 2024-02-13

## 2024-02-13 NOTE — TELEPHONE ENCOUNTER
Refill Passed Protocol:     Pt requesting refill of   Requested Prescriptions     Pending Prescriptions Disp Refills    Omeprazole 40 MG Oral Capsule Delayed Release 30 capsule 1     Sig: Take 1 capsule (40 mg total) by mouth daily.     Refill was approved and sent to pharmacy:   Gastroesophageal reflux disease without esophagitis  - suspect cough is from untreated GERD  - START trial of PPI therapy daily x 1-2 months  - recommend antireflux diet and lifestyle changes  - if sx worsen or persist, may need referral to GI  - Omeprazole 40 MG Oral Capsule Delayed Release; Take 1 capsule (40 mg total) by mouth daily.  Dispense: 30 capsule; Refill: 1    Last Time Medication was Filled:  1/24/2024    Last Office Visit with Provider: 1/24/2024    No future appointments.  Return in about 3 months (around 4/24/2024) for annual physical with pap.

## 2024-03-05 ENCOUNTER — OFFICE VISIT (OUTPATIENT)
Dept: FAMILY MEDICINE CLINIC | Facility: CLINIC | Age: 39
End: 2024-03-05
Payer: COMMERCIAL

## 2024-03-05 VITALS
DIASTOLIC BLOOD PRESSURE: 89 MMHG | HEART RATE: 70 BPM | WEIGHT: 148 LBS | TEMPERATURE: 97 F | BODY MASS INDEX: 23.23 KG/M2 | RESPIRATION RATE: 16 BRPM | SYSTOLIC BLOOD PRESSURE: 131 MMHG | HEIGHT: 67 IN | OXYGEN SATURATION: 100 %

## 2024-03-05 DIAGNOSIS — K14.6 SORENESS OF TONGUE: ICD-10-CM

## 2024-03-05 DIAGNOSIS — J02.9 SORE THROAT: Primary | ICD-10-CM

## 2024-03-05 PROCEDURE — 3075F SYST BP GE 130 - 139MM HG: CPT | Performed by: NURSE PRACTITIONER

## 2024-03-05 PROCEDURE — 3079F DIAST BP 80-89 MM HG: CPT | Performed by: NURSE PRACTITIONER

## 2024-03-05 PROCEDURE — 99213 OFFICE O/P EST LOW 20 MIN: CPT | Performed by: NURSE PRACTITIONER

## 2024-03-05 PROCEDURE — 3008F BODY MASS INDEX DOCD: CPT | Performed by: NURSE PRACTITIONER

## 2024-03-05 PROCEDURE — 87880 STREP A ASSAY W/OPTIC: CPT | Performed by: NURSE PRACTITIONER

## 2024-03-05 RX ORDER — TRIAMCINOLONE ACETONIDE 0.1 %
1 PASTE (GRAM) DENTAL 3 TIMES DAILY
Qty: 5 G | Refills: 0 | Status: SHIPPED | OUTPATIENT
Start: 2024-03-05

## 2024-03-05 NOTE — PATIENT INSTRUCTIONS
Good oral hygiene  Triamcinolone paste to tongue as prescribed     If not improving, please follow-up with PCP

## 2024-03-05 NOTE — PROGRESS NOTES
CHIEF COMPLAINT:     Chief Complaint   Patient presents with    Sore Throat     One week; pt also has sores on tongue       HPI:   Cherie Foreman is a 38 year old female presents to clinic with symptoms of sore throat. Patient has had for 1 week.  Symptoms have been persistent since onset.    Patient reports: sores on tongue.  Denies fever, chills, headache, stomach upset, nasal congestion, cough, ear pain, or rash.  Has no history of strep throat; no history of mono. No one is sick at home.  No known strep or mono exposure.   No COVID exposure   Treating symptoms with ibuprofen.  Currently is trying omeprazole for possible silent reflux/dry night time cough.       Current Outpatient Medications   Medication Sig Dispense Refill    Omeprazole 40 MG Oral Capsule Delayed Release Take 1 capsule (40 mg total) by mouth daily. 30 capsule 1      Past Medical History:   Diagnosis Date    Elevated fasting glucose 08/05/2022    Gestational diabetes mellitus (GDM) controlled on oral hypoglycemic drug ([facility]) 2019    Metformin     Gestational diabetes mellitus (GDM) requiring insulin ([facility]) 11/2021    Hx of fracture of foot     Left Foot    Postpartum depression 2019    Sertraline    TB (tuberculosis), treated 2018    treated for + TB test. Pt states she took treatment meds for 9 months     TB of limb bones     Left Hip      Social History:  Social History     Socioeconomic History    Marital status:    Tobacco Use    Smoking status: Never    Smokeless tobacco: Never   Vaping Use    Vaping Use: Never used   Substance and Sexual Activity    Alcohol use: Not Currently     Comment: Rarely     Drug use: No    Sexual activity: Yes     Partners: Male     Birth control/protection: Condom   Other Topics Concern    Caffeine Concern No    Exercise No    Seat Belt No    Special Diet No    Stress Concern No    Weight Concern No        REVIEW OF SYSTEMS:   GENERAL HEALTH:  See HPI  SKIN: denies any unusual skin lesions or rashes  HEENT:  denies ear pain, See HPI  RESPIRATORY: denies shortness of breath or wheezing  CARDIOVASCULAR: denies chest pain, palpitations   GI: denies abdominal pain, constipation and diarrhea  NEURO: denies dizziness or lightheadedness    EXAM:   /89   Pulse 70   Temp 97.2 °F (36.2 °C) (Temporal)   Resp 16   Ht 5' 7\" (1.702 m)   Wt 148 lb (67.1 kg)   LMP 02/23/2024 (Exact Date)   SpO2 100%   BMI 23.18 kg/m²   GENERAL: well developed, well nourished, in no apparent distress  SKIN: no rashes,no suspicious lesions  HEAD: atraumatic, normocephalic  EYES: conjunctiva clear  EARS: TM's clear, non-injected, no bulging, retraction, or fluid  NOSE: nostrils patent, no nasal mucus, nasal mucosa pink and noninflamed  THROAT: oral mucosa pink, moist. Posterior pharynx minimally erythematous and injected. No exudates. Tonsils 1/4.  Uvula is midline.  No trismus, hoarseness, muffled voice, or stridor.    TONGUE: posterior taste buds slightly enlarged, normal color.   NECK: supple  LUNGS: clear to auscultation bilaterally. Breathing is non labored.  CARDIO: RRR without murmur  EXTREMITIES: no cyanosis, clubbing or edema  LYMPH: No anterior cervical, submandibular, posterior cervical or occipital lymphadenopathy.    Recent Results (from the past 24 hour(s))   Rapid Strep    Collection Time: 03/05/24 10:24 AM   Result Value Ref Range    Strep Grp A Screen negative Negative    Control Line Present with a clear background (yes/no) yes Yes/No    Kit Lot # [id number] Numeric    Kit Expiration Date 4/22/2025 Date       ASSESSMENT AND PLAN:   Assessment:   Encounter Diagnoses   Name Primary?    Sore throat Yes    Soreness of tongue        Plan:  Discussed differentials.  No exposure to hand foot mouth, no history of oral herpes.     Good oral hygiene   Will try triamcinolone paste on tongue  Discussed likely viral etiology  Declined sending throat culture.  Comfort measures explained and discussed as listed in Patient  Instructions  Follow up in 3-5 days if not improving, condition worsens, or fever greater than or equal to 100.4 persists for 72 hours.    Verbalized understanding.         Meds & Refills for this Visit:  Requested Prescriptions     Signed Prescriptions Disp Refills    triamcinolone acetonide 0.1 % Mouth/Throat Paste 5 g 0     Sig: Place 1 Application onto teeth 3 (three) times daily. Apply to tongue after meals           Patient Instructions   Good oral hygiene  Triamcinolone paste to tongue as prescribed     If not improving, please follow-up with PCP  .

## 2024-03-26 ENCOUNTER — TELEPHONE (OUTPATIENT)
Dept: FAMILY MEDICINE CLINIC | Facility: CLINIC | Age: 39
End: 2024-03-26

## 2024-03-26 DIAGNOSIS — K21.9 GASTROESOPHAGEAL REFLUX DISEASE WITHOUT ESOPHAGITIS: Primary | ICD-10-CM

## 2024-03-26 NOTE — TELEPHONE ENCOUNTER
Pt called, states she is still not feeling well and was told by PCP she would be referred to GI.    Rerral pended for provider for provider review and signature.

## 2024-03-27 NOTE — TELEPHONE ENCOUNTER
Referred to Provider Information:  Provider Address Phone   Musa Phelan,  [address] [place] [address] [phone]     LMOM to return call to the office. Provided pt office phone [phone] along with office hours.

## 2024-06-29 ENCOUNTER — HOSPITAL ENCOUNTER (OUTPATIENT)
Dept: ULTRASOUND IMAGING | Age: 39
Discharge: HOME | End: 2024-06-29
Attending: INTERNAL MEDICINE
Payer: COMMERCIAL

## 2024-06-29 DIAGNOSIS — R10.13 EPIGASTRIC PAIN: ICD-10-CM

## 2024-06-29 PROCEDURE — 76700 US EXAM ABDOM COMPLETE: CPT | Performed by: INTERNAL MEDICINE

## 2024-08-28 ENCOUNTER — OFFICE VISIT (OUTPATIENT)
Dept: FAMILY MEDICINE CLINIC | Facility: CLINIC | Age: 39
End: 2024-08-28
Payer: COMMERCIAL

## 2024-08-28 VITALS
HEART RATE: 89 BPM | DIASTOLIC BLOOD PRESSURE: 72 MMHG | SYSTOLIC BLOOD PRESSURE: 118 MMHG | WEIGHT: 146.81 LBS | RESPIRATION RATE: 16 BRPM | OXYGEN SATURATION: 97 % | TEMPERATURE: 98 F | HEIGHT: 66.69 IN | BODY MASS INDEX: 23.32 KG/M2

## 2024-08-28 DIAGNOSIS — Z13.0 SCREENING FOR ENDOCRINE, NUTRITIONAL, METABOLIC AND IMMUNITY DISORDER: ICD-10-CM

## 2024-08-28 DIAGNOSIS — Z86.32 HISTORY OF GESTATIONAL DIABETES: ICD-10-CM

## 2024-08-28 DIAGNOSIS — Z13.21 SCREENING FOR ENDOCRINE, NUTRITIONAL, METABOLIC AND IMMUNITY DISORDER: ICD-10-CM

## 2024-08-28 DIAGNOSIS — Z00.00 ANNUAL PHYSICAL EXAM: Primary | ICD-10-CM

## 2024-08-28 DIAGNOSIS — Z13.29 SCREENING FOR ENDOCRINE, NUTRITIONAL, METABOLIC AND IMMUNITY DISORDER: ICD-10-CM

## 2024-08-28 DIAGNOSIS — Z13.6 SCREENING FOR ISCHEMIC HEART DISEASE: ICD-10-CM

## 2024-08-28 DIAGNOSIS — Z13.228 SCREENING FOR ENDOCRINE, NUTRITIONAL, METABOLIC AND IMMUNITY DISORDER: ICD-10-CM

## 2024-08-28 DIAGNOSIS — Z12.4 SCREENING FOR CERVICAL CANCER: ICD-10-CM

## 2024-08-28 LAB
ALBUMIN SERPL-MCNC: 4.9 G/DL (ref 3.2–4.8)
ALBUMIN/GLOB SERPL: 1.7 {RATIO} (ref 1–2)
ALP LIVER SERPL-CCNC: 65 U/L
ALT SERPL-CCNC: 12 U/L
ANION GAP SERPL CALC-SCNC: 4 MMOL/L (ref 0–18)
AST SERPL-CCNC: 14 U/L (ref ?–34)
BASOPHILS # BLD AUTO: 0.03 X10(3) UL (ref 0–0.2)
BASOPHILS NFR BLD AUTO: 0.3 %
BILIRUB SERPL-MCNC: 0.9 MG/DL (ref 0.3–1.2)
BUN BLD-MCNC: 10 MG/DL (ref 9–23)
CALCIUM BLD-MCNC: 9.7 MG/DL (ref 8.7–10.4)
CHLORIDE SERPL-SCNC: 107 MMOL/L (ref 98–112)
CHOLEST SERPL-MCNC: 137 MG/DL (ref ?–200)
CO2 SERPL-SCNC: 27 MMOL/L (ref 21–32)
CREAT BLD-MCNC: 0.74 MG/DL
EGFRCR SERPLBLD CKD-EPI 2021: 106 ML/MIN/1.73M2 (ref 60–?)
EOSINOPHIL # BLD AUTO: 0.23 X10(3) UL (ref 0–0.7)
EOSINOPHIL NFR BLD AUTO: 2.7 %
ERYTHROCYTE [DISTWIDTH] IN BLOOD BY AUTOMATED COUNT: 13.1 %
EST. AVERAGE GLUCOSE BLD GHB EST-MCNC: 105 MG/DL (ref 68–126)
FASTING PATIENT LIPID ANSWER: YES
FASTING STATUS PATIENT QL REPORTED: YES
GLOBULIN PLAS-MCNC: 2.9 G/DL (ref 2–3.5)
GLUCOSE BLD-MCNC: 92 MG/DL (ref 70–99)
HBA1C MFR BLD: 5.3 % (ref ?–5.7)
HCT VFR BLD AUTO: 38.1 %
HDLC SERPL-MCNC: 41 MG/DL (ref 40–59)
HGB BLD-MCNC: 12.8 G/DL
IMM GRANULOCYTES # BLD AUTO: 0.02 X10(3) UL (ref 0–1)
IMM GRANULOCYTES NFR BLD: 0.2 %
LDLC SERPL CALC-MCNC: 82 MG/DL (ref ?–100)
LYMPHOCYTES # BLD AUTO: 2.29 X10(3) UL (ref 1–4)
LYMPHOCYTES NFR BLD AUTO: 26.4 %
MCH RBC QN AUTO: 27.1 PG (ref 26–34)
MCHC RBC AUTO-ENTMCNC: 33.6 G/DL (ref 31–37)
MCV RBC AUTO: 80.5 FL
MONOCYTES # BLD AUTO: 0.56 X10(3) UL (ref 0.1–1)
MONOCYTES NFR BLD AUTO: 6.5 %
NEUTROPHILS # BLD AUTO: 5.53 X10 (3) UL (ref 1.5–7.7)
NEUTROPHILS # BLD AUTO: 5.53 X10(3) UL (ref 1.5–7.7)
NEUTROPHILS NFR BLD AUTO: 63.9 %
NONHDLC SERPL-MCNC: 96 MG/DL (ref ?–130)
OSMOLALITY SERPL CALC.SUM OF ELEC: 285 MOSM/KG (ref 275–295)
PLATELET # BLD AUTO: 375 10(3)UL (ref 150–450)
POTASSIUM SERPL-SCNC: 5.5 MMOL/L (ref 3.5–5.1)
PROT SERPL-MCNC: 7.8 G/DL (ref 5.7–8.2)
RBC # BLD AUTO: 4.73 X10(6)UL
SODIUM SERPL-SCNC: 138 MMOL/L (ref 136–145)
TRIGL SERPL-MCNC: 70 MG/DL (ref 30–149)
TSI SER-ACNC: 1.81 MIU/ML (ref 0.55–4.78)
VLDLC SERPL CALC-MCNC: 11 MG/DL (ref 0–30)
WBC # BLD AUTO: 8.7 X10(3) UL (ref 4–11)

## 2024-08-28 PROCEDURE — 83036 HEMOGLOBIN GLYCOSYLATED A1C: CPT | Performed by: FAMILY MEDICINE

## 2024-08-28 PROCEDURE — 80061 LIPID PANEL: CPT | Performed by: FAMILY MEDICINE

## 2024-08-28 PROCEDURE — 80050 GENERAL HEALTH PANEL: CPT | Performed by: FAMILY MEDICINE

## 2024-08-28 PROCEDURE — 99395 PREV VISIT EST AGE 18-39: CPT | Performed by: FAMILY MEDICINE

## 2024-08-28 PROCEDURE — 87624 HPV HI-RISK TYP POOLED RSLT: CPT | Performed by: FAMILY MEDICINE

## 2024-08-28 PROCEDURE — 3074F SYST BP LT 130 MM HG: CPT | Performed by: FAMILY MEDICINE

## 2024-08-28 PROCEDURE — 3078F DIAST BP <80 MM HG: CPT | Performed by: FAMILY MEDICINE

## 2024-08-28 PROCEDURE — 3008F BODY MASS INDEX DOCD: CPT | Performed by: FAMILY MEDICINE

## 2024-08-28 PROCEDURE — 36415 COLL VENOUS BLD VENIPUNCTURE: CPT | Performed by: FAMILY MEDICINE

## 2024-08-29 LAB — HPV E6+E7 MRNA CVX QL NAA+PROBE: NEGATIVE

## 2024-08-30 NOTE — PROGRESS NOTES
Chief Complaint   Patient presents with    Physical     Annual exam with pap exam        HPI:   Cherie Foreman is a 39 year old female who presents for a complete physical without gyne exam.   Patient feels well, dental visit up to date, no hearing problem.  Vaccinations up to date.    LMP: 24  Sexual activity:monogamy   Contraception: not interested   Exercise:  walking 2x week, 2-3 miles .  Diet:  stress eating    Wt Readings from Last 3 Encounters:   24 146 lb 12.8 oz (66.6 kg)   24 147 lb (66.7 kg)   24 148 lb (67.1 kg)      BP Readings from Last 3 Encounters:   24 118/72   24 126/86   24 131/89     Patient's last menstrual period was 2024 (exact date).     Annual physical:  Overall pt states she feels well. She had lost her father in the recent months, who is living in [place]. She is feeling better these days.    LMP: 24  Sexually Active: monogamous  Last pap smear: overdue today  Last mammogram: n/a  Last Colon Ca screening: n/a  Last DEXA Scan: n/a    Exercise:  walking 2x week, 2-3 miles .    Diet:  stress eating    She has a j/o Gestational DM and is requesting lab testing.  She has no polyuria and no polydipsia.    Pt has no other concerns.        No current outpatient medications on file.      Past Medical History:    Bloating    Constipation    Elevated fasting glucose    Gestational diabetes mellitus (GDM) controlled on oral hypoglycemic drug (HCC)    Metformin     Gestational diabetes mellitus (GDM) requiring insulin (HCC)    Hx of fracture of foot    Left Foot    Postpartum depression    Sertraline    TB (tuberculosis), treated    treated for + TB test. Pt states she took treatment meds for 9 months     TB of limb bones    Left Hip      Past Surgical History:   Procedure Laterality Date    Foot fracture surgery Left       2021      10/26/2019      Family History   Problem Relation Age of Onset    Other (Other) Mother         prediabetes     Other (Parkinson's) Father 60    Diabetes Paternal Grandmother     Heart Attack Paternal Grandmother     Breast Cancer Neg     Colon Cancer Neg     Ovarian Cancer Neg       Social History:  Social History     Socioeconomic History    Marital status:    Tobacco Use    Smoking status: Never    Smokeless tobacco: Never   Vaping Use    Vaping status: Never Used   Substance and Sexual Activity    Alcohol use: Not Currently     Comment: Rarely     Drug use: No    Sexual activity: Yes     Partners: Male     Birth control/protection: Condom   Other Topics Concern    Caffeine Concern No    Exercise No    Seat Belt No    Special Diet No    Stress Concern No    Weight Concern No       Allergies:  No Known Allergies     REVIEW OF SYSTEMS:     Review of Systems   Constitutional:  Negative for appetite change, fatigue and unexpected weight change.   Cardiovascular:  Negative for chest pain, palpitations and leg swelling.   Gastrointestinal:  Negative for abdominal pain, constipation, diarrhea, nausea and vomiting.   Endocrine: Negative for polydipsia and polyuria.   Genitourinary:  Negative for dysuria and menstrual problem.        EXAM:   /72 (BP Location: Right arm, Patient Position: Sitting, Cuff Size: adult)   Pulse 89   Temp 98.2 °F (36.8 °C) (Temporal)   Resp 16   Ht 5' 6.69\" (1.694 m)   Wt 146 lb 12.8 oz (66.6 kg)   LMP 08/09/2024 (Exact Date)   SpO2 97%   BMI 23.20 kg/m²    GENERAL: WD/WN in no acute distress.   HEENT: PERRLA and EOMI.  OP moist no lesions.TM WNL, brandt.Normal ears canals bilaterally.  Neck is supple, with no cervical LAD or thyroid abnormalities.  LUNGS: are clear to auscultation bilaterally, with no wheeze, rhonchi, or rales.   HEART: is RRR.  S1, S2, with no murmurs,clicks, gallops  BREAST: deferred  ABDOMEN: is soft,NBS, NT/ND with no HSM.  No rebound or guarding. No CVA tenderness, no hernias.   EXAM: deferred  RECTAL EXAM: deferred  NEURO: Cranial nerves II-XII normal,no  focal abnormalities, and reflexes coordination and gait normal and symmetric.Sensation intact.  EXTREMETIES: are symmetric with no cyanosis, clubbing, or edema.  MS: Normal muscles tones, no joints abnormalities.  SKIN: Normal color, turgor, no lesions, rashes or wounds.  PSYCH: normal affect and mood.    ASSESSMENT AND PLAN:     39 year old female with     1. Annual physical exam  Routine labs ordered today, await results. Counseled pt on healthy lifestyle changes. Vaccines today: UTD . Contraception: not interested .     Last pap: overdue, completed today    - CBC With Differential With Platelet; Future  - Comp Metabolic Panel; Future  - Lipid Panel; Future  - Hemoglobin A1C; Future  - TSH W Reflex To Free T4; Future  - ThinPrep PAP Smear; Future  - Hpv Dna  High Risk , Thin Prep Collect; Future  - CBC With Differential With Platelet  - Comp Metabolic Panel  - Lipid Panel  - Hemoglobin A1C  - TSH W Reflex To Free T4  - ThinPrep PAP Smear  - Hpv Dna  High Risk , Thin Prep Collect  - Image-Guided Pap Smear ([facility])    2. Screening for cervical cancer    - ThinPrep PAP Smear; Future  - Hpv Dna  High Risk , Thin Prep Collect; Future  - ThinPrep PAP Smear  - Hpv Dna  High Risk , Thin Prep Collect  - Image-Guided Pap Smear ([facility])    3. History of gestational diabetes  - check A1c    - Hemoglobin A1C; Future  - Hemoglobin A1C    4. Screening for ischemic heart disease    - Lipid Panel; Future  - Lipid Panel    5. Screening for endocrine, nutritional, metabolic and immunity disorder    - CBC With Differential With Platelet; Future  - Comp Metabolic Panel; Future  - TSH W Reflex To Free T4; Future  - CBC With Differential With Platelet  - Comp Metabolic Panel  - TSH W Reflex To Free T4        Pt's was recommended low fat diet and aerobic exercise 30 minutes three times weekly.   Health maintenance.   Osteoporosis prevention addressed  Recommended whole food type diet, eliminate processed food/low sugar and low sat fat  diet      The patient indicates understanding of these issues and agrees to the plan.    Return in about 1 year (around 8/28/2025) for annual physical.

## 2024-09-04 LAB
.: NORMAL
.: NORMAL

## 2024-10-09 ENCOUNTER — OFFICE VISIT (OUTPATIENT)
Dept: FAMILY MEDICINE CLINIC | Facility: CLINIC | Age: 39
End: 2024-10-09
Payer: COMMERCIAL

## 2024-10-09 VITALS
RESPIRATION RATE: 18 BRPM | HEART RATE: 95 BPM | DIASTOLIC BLOOD PRESSURE: 74 MMHG | OXYGEN SATURATION: 98 % | WEIGHT: 148.38 LBS | BODY MASS INDEX: 23 KG/M2 | SYSTOLIC BLOOD PRESSURE: 116 MMHG | TEMPERATURE: 98 F

## 2024-10-09 DIAGNOSIS — N92.6 MISSED PERIOD: ICD-10-CM

## 2024-10-09 DIAGNOSIS — Z23 NEED FOR VACCINATION: ICD-10-CM

## 2024-10-09 DIAGNOSIS — Z3A.01 LESS THAN 8 WEEKS GESTATION OF PREGNANCY (HCC): Primary | ICD-10-CM

## 2024-10-09 DIAGNOSIS — Z64.0 UNWANTED PREGNANCY WITH PLANS FOR TERMINATION: ICD-10-CM

## 2024-10-09 LAB
CONTROL LINE PRESENT WITH A CLEAR BACKGROUND (YES/NO): YES YES/NO
KIT LOT #: NORMAL NUMERIC
PREGNANCY TEST, URINE: POSITIVE

## 2024-10-09 PROCEDURE — 3074F SYST BP LT 130 MM HG: CPT | Performed by: FAMILY MEDICINE

## 2024-10-09 PROCEDURE — 99213 OFFICE O/P EST LOW 20 MIN: CPT | Performed by: FAMILY MEDICINE

## 2024-10-09 PROCEDURE — 90656 IIV3 VACC NO PRSV 0.5 ML IM: CPT | Performed by: FAMILY MEDICINE

## 2024-10-09 PROCEDURE — 81025 URINE PREGNANCY TEST: CPT | Performed by: FAMILY MEDICINE

## 2024-10-09 PROCEDURE — 3078F DIAST BP <80 MM HG: CPT | Performed by: FAMILY MEDICINE

## 2024-10-09 PROCEDURE — 90471 IMMUNIZATION ADMIN: CPT | Performed by: FAMILY MEDICINE

## 2024-10-09 NOTE — PROGRESS NOTES
CHIEF COMPLAINT:   Chief Complaint   Patient presents with    Pregnancy     Positive at home test unplanned          HPI:     Cherie Foreman is a 39 year old female presents for pregnancy.    Unwanted pregnancy:  pt LMP was 24, is currently 5 wga per LMP.  She has some cramping, but no severe pain and no vaginal bleeding or discharge.  She and her  decided they desire termination.  She is asking about medication for .  She states her  \"was supposed to get a vasectomy.\"              HISTORY:  Past Medical History:    Bloating    Constipation    Elevated fasting glucose    Gestational diabetes mellitus (GDM) controlled on oral hypoglycemic drug (HCC)    Metformin     Gestational diabetes mellitus (GDM) requiring insulin (HCC)    Hx of fracture of foot    Left Foot    Postpartum depression    Sertraline    TB (tuberculosis), treated    treated for + TB test. Pt states she took treatment meds for 9 months     TB of limb bones    Left Hip      Past Surgical History:   Procedure Laterality Date    Foot fracture surgery Left       2021      10/26/2019      Family History   Problem Relation Age of Onset    Other (Other) Mother         prediabetes    Other (Parkinson's) Father 60    Diabetes Paternal Grandmother     Heart Attack Paternal Grandmother     Breast Cancer Neg     Colon Cancer Neg     Ovarian Cancer Neg       Social History:   Social History     Socioeconomic History    Marital status:    Tobacco Use    Smoking status: Never    Smokeless tobacco: Never   Vaping Use    Vaping status: Never Used   Substance and Sexual Activity    Alcohol use: Not Currently     Comment: Rarely     Drug use: No    Sexual activity: Yes     Partners: Male     Birth control/protection: Condom   Other Topics Concern    Caffeine Concern No    Exercise No    Seat Belt No    Special Diet No    Stress Concern No    Weight Concern No        Medications (Active prior to today's visit):  No current  outpatient medications on file.       Allergies:  Allergies[1]    PSFH elements reviewed from today and agreed except as otherwise stated in HPI.  ROS:     Review of Systems   Constitutional:  Negative for chills, fatigue and fever.   Genitourinary:  Positive for menstrual problem. Negative for pelvic pain, vaginal bleeding and vaginal pain.         Pertinent positives and negatives noted in the the HPI.    PHYSICAL EXAM:   /74 (BP Location: Left arm, Patient Position: Sitting, Cuff Size: adult)   Pulse 95   Temp 98.2 °F (36.8 °C) (Temporal)   Resp 18   Wt 148 lb 6.4 oz (67.3 kg)   LMP 08/09/2024 (Exact Date)   SpO2 98%   BMI 23.46 kg/m²   Vital signs reviewed.Appears stated age, well groomed.  Physical Exam  Vitals reviewed.   Constitutional:       Appearance: Normal appearance.   HENT:      Head: Normocephalic.   Cardiovascular:      Rate and Rhythm: Normal rate and regular rhythm.      Pulses: Normal pulses.      Heart sounds: Normal heart sounds.   Pulmonary:      Effort: Pulmonary effort is normal.      Breath sounds: Normal breath sounds.   Skin:     General: Skin is warm and dry.   Neurological:      Mental Status: She is alert and oriented to person, place, and time.   Psychiatric:         Behavior: Behavior normal.          LABS     Office Visit on 10/09/2024   Component Date Value    Pregnancy Test, Urine 10/09/2024 positive     Control Line Present wit* 10/09/2024 yes     Kit Lot # 10/09/2024 [id number]     Kit Expiration Date 10/09/2024 01/11/25    Office Visit on 08/28/2024   Component Date Value    WBC 08/28/2024 8.7     RBC 08/28/2024 4.73     HGB 08/28/2024 12.8     HCT 08/28/2024 38.1     PLT 08/28/2024 375.0     MCV 08/28/2024 80.5     MCH 08/28/2024 27.1     MCHC 08/28/2024 33.6     RDW 08/28/2024 13.1     Neutrophil Absolute Prel* 08/28/2024 5.53     Neutrophil Absolute 08/28/2024 5.53     Lymphocyte Absolute 08/28/2024 2.29     Monocyte Absolute 08/28/2024 0.56     Eosinophil Absolute  08/28/2024 0.23     Basophil Absolute 08/28/2024 0.03     Immature Granulocyte Abs* 08/28/2024 0.02     Neutrophil % 08/28/2024 63.9     Lymphocyte % 08/28/2024 26.4     Monocyte % 08/28/2024 6.5     Eosinophil % 08/28/2024 2.7     Basophil % 08/28/2024 0.3     Immature Granulocyte % 08/28/2024 0.2     Glucose 08/28/2024 92     Sodium 08/28/2024 138     Potassium 08/28/2024 5.5 (H)     Chloride 08/28/2024 107     CO2 08/28/2024 27.0     Anion Gap 08/28/2024 4     BUN 08/28/2024 10     Creatinine 08/28/2024 0.74     Calcium, Total 08/28/2024 9.7     Calculated Osmolality 08/28/2024 285     eGFR-Cr 08/28/2024 106     AST 08/28/2024 14     ALT 08/28/2024 12     Alkaline Phosphatase 08/28/2024 65     Bilirubin, Total 08/28/2024 0.9     Total Protein 08/28/2024 7.8     Albumin 08/28/2024 4.9 (H)     Globulin  08/28/2024 2.9     A/G Ratio 08/28/2024 1.7     Patient Fasting for CMP? 08/28/2024 Yes     Cholesterol, Total 08/28/2024 137     HDL Cholesterol 08/28/2024 41     Triglycerides 08/28/2024 70     LDL Cholesterol 08/28/2024 82     VLDL 08/28/2024 11     Non HDL Chol 08/28/2024 96     Patient Fasting for Lipi* 08/28/2024 Yes     HgbA1C 08/28/2024 5.3     Estimated Average Glucose 08/28/2024 105     TSH 08/28/2024 1.808     HPV High Risk 08/28/2024 Negative     HPV Source 08/28/2024 Cervical/endocervical/vaginal     Diagnosis:  08/28/2024 NEGATIVE FOR INTRAEPITHELIAL LESION OR MALIGNANCY.  THIS SPECIMEN WAS RESCREENED AS PART OF OUR  PROGRAM.     Specimen Adequacy: 08/28/2024 Comment     Performed By: 08/28/2024 Comment     QC Reviewed By: 08/28/2024 Comment     . 08/28/2024 .     Note: 08/28/2024 Comment     Test Methodology: 08/28/2024 Comment     Clinician provided ICD10: 08/28/2024 Comment       REVIEWED THIS VISIT  ASSESSMENT/PLAN:   39 year old female with    1. Less than 8 weeks gestation of pregnancy (HCC)  - urine hcg: POSITIVE  - LMP 9/04/24, wga 5.0 wks  - pt desires termination, referral to  OB/Gyne  - VS stable and no severe pain, I do not suspect ectopic pregnancy    - OBG Referral - [facility] ([facility])    2. Unwanted pregnancy with plans for termination  See above.    - OBG Referral - [facility] ([facility])    3. Missed period  See above.    - Urine Pregnancy Test    4. Need for vaccination    - INFLUENZA VACCINE, TRI, PRESERV FREE, 0.5 ML      Meds This Visit:  Requested Prescriptions      No prescriptions requested or ordered in this encounter       Health Maintenance:  Health Maintenance   Topic Date Due    COVID-19 Vaccine (4 - 2023-24 season) 09/01/2024    Influenza Vaccine (1) 10/01/2024    Annual Physical  08/28/2025    Pap Smear  08/28/2027    DTaP,Tdap,and Td Vaccines (4 - Td or Tdap) 11/11/2031    Annual Depression Screening  Completed    Pneumococcal Vaccine: Birth to 64yrs  Aged Out         Patient/Caregiver Education: There are no barriers to learning. Medical education done.   Outcome: Patient verbalizes understanding and agrees with plan. Advised to call or RTC if symptoms persist or worsen.    Problem List:     Patient Active Problem List   Diagnosis    History of gestational diabetes    Pelvic floor dysfunction in female    History of uterine fibroid    Abnormal uterine bleeding (AUB)    Gastroesophageal reflux disease without esophagitis       Imaging & Referrals:  INFLUENZA VACCINE, TRI, PRESERV FREE, 0.5 ML  OBG - INTERNAL     10/9/2024  Esther Davis MD      Patient understands plan and follow-up.  Return for follow up with OB/Gyne.              [1] No Known Allergies

## 2024-11-18 ENCOUNTER — TELEPHONE (OUTPATIENT)
Dept: FAMILY MEDICINE CLINIC | Facility: CLINIC | Age: 39
End: 2024-11-18

## 2024-11-18 NOTE — TELEPHONE ENCOUNTER
Pt called office stating that she is leaving on a [employer] Holiday and she would like to get a medication to skip  her period. Pt leaves Saturday 11/23.

## 2024-11-19 NOTE — TELEPHONE ENCOUNTER
Unfortunately we cannot skip her period with OCPs this wuick, it would take planning of 2-3 months ahead, since her body would need time to acclimate to the exogenous hormones in OCPs.  If she wants to still discuss for future holidays, we can at an appt. Would have to leave a urine sample for pregnancy test before any hormonal birth control is Rx'd. Thanks.

## 2024-11-20 ENCOUNTER — TELEPHONE (OUTPATIENT)
Facility: CLINIC | Age: 39
End: 2024-11-20

## 2024-11-20 NOTE — TELEPHONE ENCOUNTER
Called patient to let her know nurses recommendation. Patient stated she is leaving on Saturday. No appts available.   Patient stated she will call her primary.

## 2024-11-20 NOTE — TELEPHONE ENCOUNTER
Patient stated she is going on a [employer] trip. She wanted to know if its possible if she can be prescribed medication to delay her menstrual cycle.   Please advise.

## 2025-08-06 ENCOUNTER — OFFICE VISIT (OUTPATIENT)
Dept: FAMILY MEDICINE CLINIC | Facility: CLINIC | Age: 40
End: 2025-08-06

## 2025-08-06 VITALS
HEART RATE: 77 BPM | BODY MASS INDEX: 23.72 KG/M2 | OXYGEN SATURATION: 98 % | TEMPERATURE: 99 F | RESPIRATION RATE: 18 BRPM | SYSTOLIC BLOOD PRESSURE: 120 MMHG | DIASTOLIC BLOOD PRESSURE: 76 MMHG | WEIGHT: 149.38 LBS | HEIGHT: 66.69 IN

## 2025-08-06 DIAGNOSIS — D25.9 UTERINE LEIOMYOMA, UNSPECIFIED LOCATION: ICD-10-CM

## 2025-08-06 DIAGNOSIS — Z86.32 HISTORY OF GESTATIONAL DIABETES: ICD-10-CM

## 2025-08-06 DIAGNOSIS — Z13.6 SCREENING FOR ISCHEMIC HEART DISEASE: ICD-10-CM

## 2025-08-06 DIAGNOSIS — Z00.00 ANNUAL PHYSICAL EXAM: Primary | ICD-10-CM

## 2025-08-06 DIAGNOSIS — Z13.21 SCREENING FOR ENDOCRINE, NUTRITIONAL, METABOLIC AND IMMUNITY DISORDER: ICD-10-CM

## 2025-08-06 DIAGNOSIS — Z13.0 SCREENING FOR ENDOCRINE, NUTRITIONAL, METABOLIC AND IMMUNITY DISORDER: ICD-10-CM

## 2025-08-06 DIAGNOSIS — Z13.29 SCREENING FOR ENDOCRINE, NUTRITIONAL, METABOLIC AND IMMUNITY DISORDER: ICD-10-CM

## 2025-08-06 DIAGNOSIS — R35.0 URINARY FREQUENCY: ICD-10-CM

## 2025-08-06 DIAGNOSIS — Z13.228 SCREENING FOR ENDOCRINE, NUTRITIONAL, METABOLIC AND IMMUNITY DISORDER: ICD-10-CM

## 2025-08-06 LAB
ALBUMIN SERPL-MCNC: 5 G/DL (ref 3.2–4.8)
ALBUMIN/GLOB SERPL: 1.9 (ref 1–2)
ALP LIVER SERPL-CCNC: 58 U/L (ref 37–98)
ALT SERPL-CCNC: 15 U/L (ref 10–49)
ANION GAP SERPL CALC-SCNC: 10 MMOL/L (ref 0–18)
AST SERPL-CCNC: 19 U/L (ref ?–34)
BASOPHILS # BLD AUTO: 0.03 X10(3) UL (ref 0–0.2)
BASOPHILS NFR BLD AUTO: 0.4 %
BILIRUB SERPL-MCNC: 1.1 MG/DL (ref 0.3–1.2)
BUN BLD-MCNC: 8 MG/DL (ref 9–23)
CALCIUM BLD-MCNC: 10.3 MG/DL (ref 8.7–10.6)
CHLORIDE SERPL-SCNC: 104 MMOL/L (ref 98–112)
CHOLEST SERPL-MCNC: 162 MG/DL (ref ?–200)
CO2 SERPL-SCNC: 26 MMOL/L (ref 21–32)
CREAT BLD-MCNC: 0.8 MG/DL (ref 0.55–1.02)
EGFRCR SERPLBLD CKD-EPI 2021: 96 ML/MIN/1.73M2 (ref 60–?)
EOSINOPHIL # BLD AUTO: 0.22 X10(3) UL (ref 0–0.7)
EOSINOPHIL NFR BLD AUTO: 2.7 %
ERYTHROCYTE [DISTWIDTH] IN BLOOD BY AUTOMATED COUNT: 12.5 %
EST. AVERAGE GLUCOSE BLD GHB EST-MCNC: 105 MG/DL (ref 68–126)
FASTING PATIENT LIPID ANSWER: YES
FASTING STATUS PATIENT QL REPORTED: YES
GLOBULIN PLAS-MCNC: 2.7 G/DL (ref 2–3.5)
GLUCOSE BLD-MCNC: 88 MG/DL (ref 70–99)
HBA1C MFR BLD: 5.3 % (ref ?–5.7)
HCT VFR BLD AUTO: 37.9 % (ref 35–48)
HDLC SERPL-MCNC: 49 MG/DL (ref 40–59)
HGB BLD-MCNC: 13.3 G/DL (ref 12–16)
IMM GRANULOCYTES # BLD AUTO: 0.02 X10(3) UL (ref 0–1)
IMM GRANULOCYTES NFR BLD: 0.2 %
LDLC SERPL CALC-MCNC: 94 MG/DL (ref ?–100)
LYMPHOCYTES # BLD AUTO: 2.39 X10(3) UL (ref 1–4)
LYMPHOCYTES NFR BLD AUTO: 29.5 %
MCH RBC QN AUTO: 28.4 PG (ref 26–34)
MCHC RBC AUTO-ENTMCNC: 35.1 G/DL (ref 31–37)
MCV RBC AUTO: 80.8 FL (ref 80–100)
MONOCYTES # BLD AUTO: 0.59 X10(3) UL (ref 0.1–1)
MONOCYTES NFR BLD AUTO: 7.3 %
NEUTROPHILS # BLD AUTO: 4.86 X10 (3) UL (ref 1.5–7.7)
NEUTROPHILS # BLD AUTO: 4.86 X10(3) UL (ref 1.5–7.7)
NEUTROPHILS NFR BLD AUTO: 59.9 %
NONHDLC SERPL-MCNC: 113 MG/DL (ref ?–130)
OSMOLALITY SERPL CALC.SUM OF ELEC: 288 MOSM/KG (ref 275–295)
PLATELET # BLD AUTO: 355 10(3)UL (ref 150–450)
POTASSIUM SERPL-SCNC: 5 MMOL/L (ref 3.5–5.1)
PROT SERPL-MCNC: 7.7 G/DL (ref 5.7–8.2)
RBC # BLD AUTO: 4.69 X10(6)UL (ref 3.8–5.3)
SODIUM SERPL-SCNC: 140 MMOL/L (ref 136–145)
TRIGL SERPL-MCNC: 101 MG/DL (ref 30–149)
TSI SER-ACNC: 1.91 UIU/ML (ref 0.55–4.78)
VLDLC SERPL CALC-MCNC: 16 MG/DL (ref 0–30)
WBC # BLD AUTO: 8.1 X10(3) UL (ref 4–11)

## 2025-08-06 PROCEDURE — 80050 GENERAL HEALTH PANEL: CPT | Performed by: FAMILY MEDICINE

## 2025-08-06 PROCEDURE — 80061 LIPID PANEL: CPT | Performed by: FAMILY MEDICINE

## 2025-08-06 PROCEDURE — 83036 HEMOGLOBIN GLYCOSYLATED A1C: CPT | Performed by: FAMILY MEDICINE

## 2025-08-12 ENCOUNTER — RESULTS FOLLOW-UP (OUTPATIENT)
Dept: FAMILY MEDICINE CLINIC | Facility: CLINIC | Age: 40
End: 2025-08-12

## 2025-08-26 ENCOUNTER — HOSPITAL ENCOUNTER (OUTPATIENT)
Dept: ULTRASOUND IMAGING | Age: 40
Discharge: HOME | End: 2025-08-26
Attending: FAMILY MEDICINE

## 2025-08-26 DIAGNOSIS — D25.9 UTERINE LEIOMYOMA, UNSPECIFIED LOCATION: ICD-10-CM

## 2025-08-26 DIAGNOSIS — R35.0 URINARY FREQUENCY: ICD-10-CM

## 2025-08-26 PROCEDURE — 76830 TRANSVAGINAL US NON-OB: CPT | Performed by: FAMILY MEDICINE

## 2025-08-26 PROCEDURE — 76856 US EXAM PELVIC COMPLETE: CPT | Performed by: FAMILY MEDICINE

## (undated) NOTE — LETTER
19    RE: Semaj Smith    : 1985    To Whom It May Concern:    Our patient, Semaj Smith,      _____Has received treatment in our office on ___________________________.        _____Has been hospitalized on the following dates _____________________